# Patient Record
Sex: FEMALE | Race: WHITE
[De-identification: names, ages, dates, MRNs, and addresses within clinical notes are randomized per-mention and may not be internally consistent; named-entity substitution may affect disease eponyms.]

---

## 2018-04-12 ENCOUNTER — HOSPITAL ENCOUNTER (OUTPATIENT)
Dept: HOSPITAL 9 - MADLAB | Age: 63
Discharge: HOME | End: 2018-04-12
Payer: MEDICARE

## 2018-04-12 DIAGNOSIS — C90.00: Primary | ICD-10-CM

## 2018-04-12 LAB
BASOPHILS # BLD AUTO: 0.1 THOU/UL (ref 0–0.2)
BASOPHILS NFR BLD AUTO: 1.6 % (ref 0–1)
EOSINOPHIL # BLD AUTO: 0.5 THOU/UL (ref 0–0.7)
EOSINOPHIL NFR BLD AUTO: 6.7 % (ref 0–10)
HGB BLD-MCNC: 13.6 G/DL (ref 12–16)
LYMPHOCYTES # BLD AUTO: 2 THOU/UL (ref 1.2–3.4)
LYMPHOCYTES NFR BLD AUTO: 26 % (ref 21–51)
MCH RBC QN AUTO: 30.1 PG (ref 27–31)
MCV RBC AUTO: 90.4 FL (ref 81–99)
MONOCYTES # BLD AUTO: 0.5 THOU/UL (ref 0.11–0.59)
MONOCYTES NFR BLD AUTO: 6.9 % (ref 0–10)
NEUTROPHILS # BLD AUTO: 4.5 THOU/UL (ref 1.4–6.5)
NEUTROPHILS NFR BLD AUTO: 58.9 % (ref 42–75)
PLATELET # BLD AUTO: 239 THOU/UL (ref 130–400)
RBC # BLD AUTO: 4.51 MILL/UL (ref 4.2–5.4)
WBC # BLD AUTO: 7.6 THOU/UL (ref 4.8–10.8)

## 2018-04-12 PROCEDURE — 36415 COLL VENOUS BLD VENIPUNCTURE: CPT

## 2018-04-12 PROCEDURE — 85025 COMPLETE CBC W/AUTO DIFF WBC: CPT

## 2018-07-12 ENCOUNTER — HOSPITAL ENCOUNTER (OUTPATIENT)
Dept: HOSPITAL 9 - MADLAB | Age: 63
Discharge: HOME | End: 2018-07-12
Payer: MEDICARE

## 2018-07-12 DIAGNOSIS — I10: Primary | ICD-10-CM

## 2018-07-12 LAB
25(OH)D3+25(OH)D2 SERPL-MCNC: 43.6 NG/ML (ref 30–?)
ALBUMIN SERPL BCG-MCNC: 3.9 G/DL (ref 3.4–4.8)
ALP SERPL-CCNC: 73 U/L (ref 40–150)
ALT SERPL W P-5'-P-CCNC: 27 U/L (ref 8–55)
ANION GAP SERPL CALC-SCNC: 13 MMOL/L (ref 10–20)
AST SERPL-CCNC: 24 U/L (ref 5–34)
BASOPHILS # BLD AUTO: 0.1 THOU/UL (ref 0–0.2)
BASOPHILS NFR BLD AUTO: 2.7 % (ref 0–1)
BILIRUB SERPL-MCNC: 0.5 MG/DL (ref 0.2–1.2)
BUN SERPL-MCNC: 14 MG/DL (ref 9.8–20.1)
CALCIUM SERPL-MCNC: 9.5 MG/DL (ref 7.8–10.44)
CHD RISK SERPL-RTO: 2.7 (ref ?–4.5)
CHLORIDE SERPL-SCNC: 105 MMOL/L (ref 98–107)
CHOLEST SERPL-MCNC: 129 MG/DL
CO2 SERPL-SCNC: 23 MMOL/L (ref 23–31)
CREAT CL PREDICTED SERPL C-G-VRATE: 0 ML/MIN (ref 70–130)
EOSINOPHIL # BLD AUTO: 0.4 THOU/UL (ref 0–0.7)
EOSINOPHIL NFR BLD AUTO: 7.2 % (ref 0–10)
FREE THYROXINE INDEX: 2 (ref 1.4–3.1)
GLOBULIN SER CALC-MCNC: 2.7 G/DL (ref 2.4–3.5)
GLUCOSE SERPL-MCNC: 116 MG/DL (ref 80–115)
HDLC SERPL-MCNC: 47 MG/DL
HGB BLD-MCNC: 13.7 G/DL (ref 12–16)
IRON SERPL-MCNC: 96 UG/DL (ref 50–170)
LDLC SERPL CALC-MCNC: 64 MG/DL
LYMPHOCYTES # BLD AUTO: 2 THOU/UL (ref 1.2–3.4)
LYMPHOCYTES NFR BLD AUTO: 38.9 % (ref 21–51)
MCH RBC QN AUTO: 28 PG (ref 27–31)
MCV RBC AUTO: 87.7 FL (ref 78–98)
MONOCYTES # BLD AUTO: 0.5 THOU/UL (ref 0.11–0.59)
MONOCYTES NFR BLD AUTO: 10.1 % (ref 0–10)
NEUTROPHILS # BLD AUTO: 2.1 THOU/UL (ref 1.4–6.5)
NEUTROPHILS NFR BLD AUTO: 41.1 % (ref 42–75)
PLATELET # BLD AUTO: 177 THOU/UL (ref 130–400)
POTASSIUM SERPL-SCNC: 4.2 MMOL/L (ref 3.5–5.1)
RBC # BLD AUTO: 4.88 MILL/UL (ref 4.2–5.4)
SODIUM SERPL-SCNC: 137 MMOL/L (ref 136–145)
TRIGL SERPL-MCNC: 90 MG/DL (ref ?–150)
UIBC SERPL-MCNC: 333 MCG/DL (ref 265–497)
VIT B12 SERPL-MCNC: 535 PG/ML (ref 211–911)
WBC # BLD AUTO: 5.1 THOU/UL (ref 4.8–10.8)

## 2018-07-12 PROCEDURE — 84443 ASSAY THYROID STIM HORMONE: CPT

## 2018-07-12 PROCEDURE — 84481 FREE ASSAY (FT-3): CPT

## 2018-07-12 PROCEDURE — 83970 ASSAY OF PARATHORMONE: CPT

## 2018-07-12 PROCEDURE — 84425 ASSAY OF VITAMIN B-1: CPT

## 2018-07-12 PROCEDURE — 83550 IRON BINDING TEST: CPT

## 2018-07-12 PROCEDURE — 82607 VITAMIN B-12: CPT

## 2018-07-12 PROCEDURE — 36415 COLL VENOUS BLD VENIPUNCTURE: CPT

## 2018-07-12 PROCEDURE — 83540 ASSAY OF IRON: CPT

## 2018-07-12 PROCEDURE — 85025 COMPLETE CBC W/AUTO DIFF WBC: CPT

## 2018-07-12 PROCEDURE — 84207 ASSAY OF VITAMIN B-6: CPT

## 2018-07-12 PROCEDURE — 83036 HEMOGLOBIN GLYCOSYLATED A1C: CPT

## 2018-07-12 PROCEDURE — 84479 ASSAY OF THYROID (T3 OR T4): CPT

## 2018-07-12 PROCEDURE — 80061 LIPID PANEL: CPT

## 2018-07-12 PROCEDURE — 82306 VITAMIN D 25 HYDROXY: CPT

## 2018-07-12 PROCEDURE — 80053 COMPREHEN METABOLIC PANEL: CPT

## 2018-07-12 PROCEDURE — 82746 ASSAY OF FOLIC ACID SERUM: CPT

## 2018-08-24 ENCOUNTER — HOSPITAL ENCOUNTER (OUTPATIENT)
Dept: HOSPITAL 9 - MADLAB | Age: 63
Discharge: HOME | End: 2018-08-24
Attending: PHYSICIAN ASSISTANT
Payer: MEDICARE

## 2018-08-24 DIAGNOSIS — C90.00: Primary | ICD-10-CM

## 2018-08-24 LAB
BASOPHILS # BLD AUTO: 0.2 THOU/UL (ref 0–0.2)
BASOPHILS NFR BLD AUTO: 2.6 % (ref 0–1)
EOSINOPHIL # BLD AUTO: 0.3 THOU/UL (ref 0–0.7)
EOSINOPHIL NFR BLD AUTO: 5.4 % (ref 0–10)
HGB BLD-MCNC: 13.6 G/DL (ref 12–16)
LYMPHOCYTES # BLD AUTO: 2.3 THOU/UL (ref 1.2–3.4)
LYMPHOCYTES NFR BLD AUTO: 37.4 % (ref 21–51)
MCH RBC QN AUTO: 28.1 PG (ref 27–31)
MCV RBC AUTO: 86.8 FL (ref 78–98)
MONOCYTES # BLD AUTO: 0.6 THOU/UL (ref 0.11–0.59)
MONOCYTES NFR BLD AUTO: 10.3 % (ref 0–10)
NEUTROPHILS # BLD AUTO: 2.7 THOU/UL (ref 1.4–6.5)
NEUTROPHILS NFR BLD AUTO: 44.2 % (ref 42–75)
PLATELET # BLD AUTO: 187 THOU/UL (ref 130–400)
RBC # BLD AUTO: 4.85 MILL/UL (ref 4.2–5.4)
WBC # BLD AUTO: 6.1 THOU/UL (ref 4.8–10.8)

## 2018-08-24 PROCEDURE — 36415 COLL VENOUS BLD VENIPUNCTURE: CPT

## 2018-08-24 PROCEDURE — 85025 COMPLETE CBC W/AUTO DIFF WBC: CPT

## 2018-10-12 ENCOUNTER — HOSPITAL ENCOUNTER (OUTPATIENT)
Dept: HOSPITAL 9 - MADLAB | Age: 63
Discharge: HOME | End: 2018-10-12
Payer: MEDICARE

## 2018-10-12 DIAGNOSIS — E11.9: Primary | ICD-10-CM

## 2018-10-12 PROCEDURE — 82728 ASSAY OF FERRITIN: CPT

## 2018-10-12 PROCEDURE — 84443 ASSAY THYROID STIM HORMONE: CPT

## 2018-10-12 PROCEDURE — 80053 COMPREHEN METABOLIC PANEL: CPT

## 2018-10-12 PROCEDURE — 80061 LIPID PANEL: CPT

## 2018-10-12 PROCEDURE — 83970 ASSAY OF PARATHORMONE: CPT

## 2018-10-12 PROCEDURE — 82306 VITAMIN D 25 HYDROXY: CPT

## 2018-10-12 PROCEDURE — 84439 ASSAY OF FREE THYROXINE: CPT

## 2018-10-12 PROCEDURE — 83550 IRON BINDING TEST: CPT

## 2018-10-12 PROCEDURE — 84425 ASSAY OF VITAMIN B-1: CPT

## 2018-10-12 PROCEDURE — 85025 COMPLETE CBC W/AUTO DIFF WBC: CPT

## 2018-10-12 PROCEDURE — 84207 ASSAY OF VITAMIN B-6: CPT

## 2018-10-12 PROCEDURE — 82746 ASSAY OF FOLIC ACID SERUM: CPT

## 2018-10-12 PROCEDURE — 82607 VITAMIN B-12: CPT

## 2018-10-12 PROCEDURE — 36415 COLL VENOUS BLD VENIPUNCTURE: CPT

## 2018-10-12 PROCEDURE — 83540 ASSAY OF IRON: CPT

## 2018-10-12 PROCEDURE — 83036 HEMOGLOBIN GLYCOSYLATED A1C: CPT

## 2018-10-13 LAB
ALBUMIN SERPL BCG-MCNC: 4.1 G/DL (ref 3.4–4.8)
ALP SERPL-CCNC: 90 U/L (ref 40–150)
ALT SERPL W P-5'-P-CCNC: 24 U/L (ref 8–55)
ANION GAP SERPL CALC-SCNC: 14 MMOL/L (ref 10–20)
AST SERPL-CCNC: 21 U/L (ref 5–34)
BASOPHILS # BLD AUTO: 0.1 THOU/UL (ref 0–0.2)
BASOPHILS NFR BLD AUTO: 2.1 % (ref 0–1)
BILIRUB SERPL-MCNC: 0.5 MG/DL (ref 0.2–1.2)
BUN SERPL-MCNC: 15 MG/DL (ref 9.8–20.1)
CALCIUM SERPL-MCNC: 9.8 MG/DL (ref 7.8–10.44)
CHD RISK SERPL-RTO: 2.4 (ref ?–4.5)
CHLORIDE SERPL-SCNC: 106 MMOL/L (ref 98–107)
CHOLEST SERPL-MCNC: 133 MG/DL
CO2 SERPL-SCNC: 25 MMOL/L (ref 23–31)
CREAT CL PREDICTED SERPL C-G-VRATE: 0 ML/MIN (ref 70–130)
EOSINOPHIL # BLD AUTO: 0.3 THOU/UL (ref 0–0.7)
EOSINOPHIL NFR BLD AUTO: 5.1 % (ref 0–10)
GLOBULIN SER CALC-MCNC: 2.9 G/DL (ref 2.4–3.5)
GLUCOSE SERPL-MCNC: 123 MG/DL (ref 80–115)
HDLC SERPL-MCNC: 55 MG/DL
HGB BLD-MCNC: 13.9 G/DL (ref 12–16)
LDLC SERPL CALC-MCNC: 58 MG/DL
LYMPHOCYTES # BLD AUTO: 1.5 THOU/UL (ref 1.2–3.4)
LYMPHOCYTES NFR BLD AUTO: 28.2 % (ref 21–51)
MANUAL DIFF??: NO
MCH RBC QN AUTO: 29.4 PG (ref 27–31)
MCV RBC AUTO: 91.1 FL (ref 78–98)
MONOCYTES # BLD AUTO: 0.5 THOU/UL (ref 0.11–0.59)
MONOCYTES NFR BLD AUTO: 8.9 % (ref 0–10)
NEUTROPHILS # BLD AUTO: 2.9 THOU/UL (ref 1.4–6.5)
NEUTROPHILS NFR BLD AUTO: 55.7 % (ref 42–75)
PLATELET # BLD AUTO: 216 THOU/UL (ref 130–400)
POTASSIUM SERPL-SCNC: 3.9 MMOL/L (ref 3.5–5.1)
RBC # BLD AUTO: 4.75 MILL/UL (ref 4.2–5.4)
SODIUM SERPL-SCNC: 141 MMOL/L (ref 136–145)
TRIGL SERPL-MCNC: 99 MG/DL (ref ?–150)
TSH SERPL DL<=0.005 MIU/L-ACNC: 1.36 UIU/ML (ref 0.35–4.94)
WBC # BLD AUTO: 5.3 THOU/UL (ref 4.8–10.8)

## 2018-10-14 LAB
25(OH)D3+25(OH)D2 SERPL-MCNC: 67 NG/ML (ref 30–?)
FERRITIN SERPL-MCNC: 132.27 NG/ML (ref 10–291)
IRON SERPL-MCNC: 78 UG/DL (ref 50–170)
T4 FREE SERPL-MCNC: 1.05 NG/DL (ref 0.7–1.48)
UIBC SERPL-MCNC: 353 MCG/DL (ref 265–497)
VIT B12 SERPL-MCNC: 733 PG/ML (ref 211–911)

## 2018-11-06 ENCOUNTER — HOSPITAL ENCOUNTER (OUTPATIENT)
Dept: HOSPITAL 9 - MADLAB | Age: 63
Discharge: HOME | End: 2018-11-06
Attending: PHYSICIAN ASSISTANT
Payer: MEDICARE

## 2018-11-06 DIAGNOSIS — C90.00: Primary | ICD-10-CM

## 2018-11-06 LAB
BASOPHILS # BLD AUTO: 0.1 THOU/UL (ref 0–0.2)
BASOPHILS NFR BLD AUTO: 1.9 % (ref 0–1)
EOSINOPHIL # BLD AUTO: 0.4 THOU/UL (ref 0–0.7)
EOSINOPHIL NFR BLD AUTO: 5.7 % (ref 0–10)
HGB BLD-MCNC: 13 G/DL (ref 12–16)
LYMPHOCYTES # BLD AUTO: 2.5 THOU/UL (ref 1.2–3.4)
LYMPHOCYTES NFR BLD AUTO: 34.1 % (ref 21–51)
MCH RBC QN AUTO: 29.6 PG (ref 27–31)
MCV RBC AUTO: 92.3 FL (ref 78–98)
MONOCYTES # BLD AUTO: 0.6 THOU/UL (ref 0.11–0.59)
MONOCYTES NFR BLD AUTO: 8.5 % (ref 0–10)
NEUTROPHILS # BLD AUTO: 3.6 THOU/UL (ref 1.4–6.5)
NEUTROPHILS NFR BLD AUTO: 49.8 % (ref 42–75)
PLATELET # BLD AUTO: 229 THOU/UL (ref 130–400)
RBC # BLD AUTO: 4.38 MILL/UL (ref 4.2–5.4)
WBC # BLD AUTO: 7.2 THOU/UL (ref 4.8–10.8)

## 2018-11-06 PROCEDURE — 85025 COMPLETE CBC W/AUTO DIFF WBC: CPT

## 2018-11-06 PROCEDURE — 36415 COLL VENOUS BLD VENIPUNCTURE: CPT

## 2019-03-12 ENCOUNTER — HOSPITAL ENCOUNTER (OUTPATIENT)
Dept: HOSPITAL 9 - MADLAB | Age: 64
Discharge: HOME | End: 2019-03-12
Attending: PHYSICIAN ASSISTANT
Payer: MEDICARE

## 2019-03-12 DIAGNOSIS — C90.00: Primary | ICD-10-CM

## 2019-03-12 LAB
ANISOCYTOSIS BLD QL SMEAR: (no result) (100X)
BASOPHILS # BLD AUTO: 0.1 THOU/UL (ref 0–0.2)
BASOPHILS NFR BLD AUTO: 2.2 % (ref 0–1)
EOSINOPHIL # BLD AUTO: 0.2 THOU/UL (ref 0–0.7)
EOSINOPHIL NFR BLD AUTO: 4.1 % (ref 0–10)
HGB BLD-MCNC: 12.6 G/DL (ref 12–16)
LYMPHOCYTES NFR BLD AUTO: 35.4 % (ref 21–51)
MCH RBC QN AUTO: 29.8 PG (ref 27–31)
MCV RBC AUTO: 91.3 FL (ref 78–98)
MDIFF COMPLETE?: YES
MONOCYTES # BLD AUTO: 0.6 THOU/UL (ref 0.11–0.59)
MONOCYTES NFR BLD AUTO: 10.3 % (ref 0–10)
NEUTROPHILS # BLD AUTO: 2.9 THOU/UL (ref 1.4–6.5)
NEUTROPHILS NFR BLD AUTO: 48 % (ref 42–75)
PLATELET # BLD AUTO: 208 THOU/UL (ref 130–400)
RBC # BLD AUTO: 4.22 MILL/UL (ref 4.2–5.4)
WBC # BLD AUTO: 6.1 THOU/UL (ref 4.8–10.8)

## 2019-03-12 PROCEDURE — 36415 COLL VENOUS BLD VENIPUNCTURE: CPT

## 2019-03-12 PROCEDURE — 85025 COMPLETE CBC W/AUTO DIFF WBC: CPT

## 2019-03-14 LAB — LYMPHOCYTES # BLD AUTO: 2.2 THOU/UL (ref 1.2–3.4)

## 2019-03-27 ENCOUNTER — HOSPITAL ENCOUNTER (OUTPATIENT)
Dept: HOSPITAL 92 - BICMRI | Age: 64
Discharge: HOME | End: 2019-03-27
Attending: NEUROLOGICAL SURGERY
Payer: MEDICARE

## 2019-03-27 DIAGNOSIS — C90.00: ICD-10-CM

## 2019-03-27 DIAGNOSIS — Z98.890: ICD-10-CM

## 2019-03-27 DIAGNOSIS — M48.02: ICD-10-CM

## 2019-03-27 DIAGNOSIS — R41.89: ICD-10-CM

## 2019-03-27 DIAGNOSIS — M47.26: Primary | ICD-10-CM

## 2019-03-27 DIAGNOSIS — M47.16: ICD-10-CM

## 2019-03-27 DIAGNOSIS — G95.9: ICD-10-CM

## 2019-03-27 LAB — ESTIMATED GFR-MDRD - POC: (no result)

## 2019-03-27 PROCEDURE — 82565 ASSAY OF CREATININE: CPT

## 2019-03-27 PROCEDURE — 72156 MRI NECK SPINE W/O & W/DYE: CPT

## 2019-03-27 PROCEDURE — 70553 MRI BRAIN STEM W/O & W/DYE: CPT

## 2019-03-27 PROCEDURE — 72131 CT LUMBAR SPINE W/O DYE: CPT

## 2019-03-27 NOTE — MRI
[facility] brain with and without contrast:

3/27/2019



HISTORY:

63-year-old female with R 41.89 cognitive change.



FINDINGS:

There is no restricted diffusion. Ventricles are normal in size and configuration. No abnormal intra-
axial enhancement, intra-axial mass, mass effect, midline shift, or extra-axial fluid collection. The
re are several small patchy focal T2 hyperintense lesions ranging from a few millimeters to slightly 
greater than 1 cm in size in the bilateral corona radiata and centrum semiovale. These are nonspecifi
c, but are statistically most likely represent chronic ischemic white matter changes due to microvasc
ular atherosclerosis. Mild such changes are present in the flores. No evidence of recent or remote intr
a-axial hemorrhage.



IMPRESSION:

1. Mild chronic ischemic white matter changes.

2. Otherwise negative.



Reported By: Trevor Marmolejo 

Electronically Signed:  3/27/2019 3:25 PM

## 2019-03-27 NOTE — MRI
[facility] CERVICAL SPINE WITH AND WITHOUT CONTRAST:



HISTORY: Cervical myelopathy. The patient has had a previous history of multiple myeloma.



TECHNIQUE:

Multiplanar, multisequence pre and post contrast enhanced MR images cervical spine obtained.



C1-C2: Unremarkable.



C2-C3: There is a mild broad-based disc bulge. No significant degree of central or neural foraminal n
arrowing seen.



C3-C4: There is broad-based disc osteophyte complex centrally, compressing the thecal sac. This resul
ts in moderate to severe central and mild cord compression. There is moderate to severe bilateral C3-
4 neural foraminal narrowing due to uncovertebral osteophyte hypertrophy.



C4-C5: There is a minimal broad-based central disc bulge. The neural foramen are patent.



The C5 vertebral body is atrophied, significantly smaller than adjacent signals.  It also has increas
ed signal in T1 and T2 weighted sequences, most compatible with extensive fatty changes.



C5-C6: Significant disc space height loss is seen. The central canal and neural foramen are patent.



C6-C7: There is a broad-based disc osteophyte complex centrally, compressing the thecal sac, resultin
g in moderate to severe thecal sac compression. Cord compression is seen. The right neural foramen is
 patent. Moderate to severe left C6-7 neural foraminal narrowing is seen, due to uncovertebral osteop
hyte hypertrophy.



C7-T1: Unremarkable.



IMPRESSION:

1.   C3-C4 and C6-C7 central spinal stenosis.



2.  There is some atrophy of the C5 vertebral body, with partial fusion with the upper C6 level.



Transcribed Date/Time: 3/27/2019 3:49 PM



Reported By: Fred Silvestre 

Electronically Signed:  3/27/2019 4:38 PM

## 2019-03-27 NOTE — CT
CT LUMBAR SPINE WITHOUT CONTRAST:

 

HISTORY: 

Previous fracture to the lumbar spine.  Pain radiating down the left leg.  Left leg weakness.

 

COMPARISON: 

None.

 

FINDINGS: 

There is leftward curvature of the lumbar spine.  Unilateral left-sided transpedicular screw at L5 an
d S1.  No perihardware lucency.  There is bone graft material in the posterior elements at L4, L5, an
d S1.  Laminectomy defect at L5 and to a lesser extent at L4.

 

No retroperitoneal mass, lymphadenopathy, or hematoma.  The visualized solid organs and alimentary ca
nal are unremarkable.

 

No prevertebral soft tissue swelling.

 

Limited evaluation of the contents of the central spinal canal and neural foramen due to technique.

 

T12-L1:  No significant central canal stenosis or significant foraminal narrowing.

 

L1-L2:  Severe loss of disk space height.  Nevertheless, no high-grade central canal stenosis.  Mild 
bilateral foraminal narrowing.

 

L2-L3:  Severe loss of disk space height. No high-grade central canal stenosis.  Moderate right and m
ild to moderate left foraminal narrowing.

 

L3-L4:  Severe loss of disk space height.  Generalized disk bulge, ligamentum flavum thickening, and 
facet hypertrophy result in mild to moderate central canal stenosis.  Right neural foramen is moderat
ely narrowed.  The left foramen is patent.

 

L4-L5:  Laminectomy defect.  Generalized disk bulge, posterior element hypertrophy result in at least
 mild central canal stenosis.  Mild bilateral neural foraminal narrowing.

 

L5-S1:  Laminectomy defect.  Limited evaluation of the central spinal canal.  No evidence of high-gra
de central canal stenosis.  Mild right and mild to moderate left foraminal narrowing.  The left neura
l foramen is slightly narrowed due to osteophyte formation along the inferior end plate of L5.  

 

IMPRESSION: 

1.  Degenerative change of the lumbar spine as described above.

 

2.  Fusion change of the lumbar spine as described above.

 

3.  No evidence of high-grade central spinal canal.  Varying degrees of foraminal narrowing as detail
ed above.

 

POS: [facility]

## 2019-04-12 ENCOUNTER — HOSPITAL ENCOUNTER (OUTPATIENT)
Dept: HOSPITAL 9 - MADLAB | Age: 64
Discharge: HOME | End: 2019-04-12
Payer: MEDICARE

## 2019-04-12 DIAGNOSIS — Z79.899: ICD-10-CM

## 2019-04-12 DIAGNOSIS — C90.00: Primary | ICD-10-CM

## 2019-04-12 LAB
ALBUMIN SERPL BCG-MCNC: 3.9 G/DL (ref 3.4–4.8)
ALP SERPL-CCNC: 77 U/L (ref 40–150)
ALT SERPL W P-5'-P-CCNC: 19 U/L (ref 8–55)
ANION GAP SERPL CALC-SCNC: 15 MMOL/L (ref 10–20)
AST SERPL-CCNC: 18 U/L (ref 5–34)
BASOPHILS # BLD AUTO: 0.1 THOU/UL (ref 0–0.2)
BASOPHILS NFR BLD AUTO: 2.2 % (ref 0–1)
BILIRUB DIRECT SERPL-MCNC: 0.2 MG/DL (ref 0.1–0.3)
BILIRUB SERPL-MCNC: 0.4 MG/DL (ref 0.2–1.2)
BUN SERPL-MCNC: 13 MG/DL (ref 9.8–20.1)
CALCIUM SERPL-MCNC: 9.4 MG/DL (ref 7.8–10.44)
CHD RISK SERPL-RTO: 2.7 (ref ?–4.5)
CHLORIDE SERPL-SCNC: 105 MMOL/L (ref 98–107)
CHOLEST SERPL-MCNC: 161 MG/DL
CO2 SERPL-SCNC: 25 MMOL/L (ref 23–31)
CREAT CL PREDICTED SERPL C-G-VRATE: 0 ML/MIN (ref 70–130)
EOSINOPHIL # BLD AUTO: 0.2 THOU/UL (ref 0–0.7)
EOSINOPHIL NFR BLD AUTO: 5.1 % (ref 0–10)
GLUCOSE SERPL-MCNC: 95 MG/DL (ref 80–115)
HDLC SERPL-MCNC: 59 MG/DL
HGB BLD-MCNC: 13 G/DL (ref 12–16)
IRON SERPL-MCNC: 70 UG/DL (ref 50–170)
LDLC SERPL CALC-MCNC: 86 MG/DL
LYMPHOCYTES # BLD AUTO: 1.3 THOU/UL (ref 1.2–3.4)
LYMPHOCYTES NFR BLD AUTO: 31.2 % (ref 21–51)
MCH RBC QN AUTO: 28.7 PG (ref 27–31)
MCV RBC AUTO: 89.7 FL (ref 78–98)
MONOCYTES # BLD AUTO: 0.4 THOU/UL (ref 0.11–0.59)
MONOCYTES NFR BLD AUTO: 9.8 % (ref 0–10)
NEUTROPHILS # BLD AUTO: 2.1 THOU/UL (ref 1.4–6.5)
NEUTROPHILS NFR BLD AUTO: 51.8 % (ref 42–75)
PLATELET # BLD AUTO: 188 THOU/UL (ref 130–400)
POTASSIUM SERPL-SCNC: 4 MMOL/L (ref 3.5–5.1)
RBC # BLD AUTO: 4.53 MILL/UL (ref 4.2–5.4)
SODIUM SERPL-SCNC: 141 MMOL/L (ref 136–145)
TRIGL SERPL-MCNC: 81 MG/DL (ref ?–150)
UIBC SERPL-MCNC: 299 MCG/DL (ref 265–497)
VIT B12 SERPL-MCNC: (no result) PG/ML (ref 211–911)
WBC # BLD AUTO: 4 THOU/UL (ref 4.8–10.8)

## 2019-04-12 PROCEDURE — 84590 ASSAY OF VITAMIN A: CPT

## 2019-04-12 PROCEDURE — 84630 ASSAY OF ZINC: CPT

## 2019-04-12 PROCEDURE — 80048 BASIC METABOLIC PNL TOTAL CA: CPT

## 2019-04-12 PROCEDURE — 82306 VITAMIN D 25 HYDROXY: CPT

## 2019-04-12 PROCEDURE — 83540 ASSAY OF IRON: CPT

## 2019-04-12 PROCEDURE — 82607 VITAMIN B-12: CPT

## 2019-04-12 PROCEDURE — 83036 HEMOGLOBIN GLYCOSYLATED A1C: CPT

## 2019-04-12 PROCEDURE — 85025 COMPLETE CBC W/AUTO DIFF WBC: CPT

## 2019-04-12 PROCEDURE — 83550 IRON BINDING TEST: CPT

## 2019-04-12 PROCEDURE — 80076 HEPATIC FUNCTION PANEL: CPT

## 2019-04-12 PROCEDURE — 82525 ASSAY OF COPPER: CPT

## 2019-04-12 PROCEDURE — 84425 ASSAY OF VITAMIN B-1: CPT

## 2019-04-12 PROCEDURE — 36415 COLL VENOUS BLD VENIPUNCTURE: CPT

## 2019-04-12 PROCEDURE — 80061 LIPID PANEL: CPT

## 2019-04-12 PROCEDURE — 82746 ASSAY OF FOLIC ACID SERUM: CPT

## 2019-05-14 ENCOUNTER — HOSPITAL ENCOUNTER (OUTPATIENT)
Dept: HOSPITAL 9 - MADLAB | Age: 64
Discharge: HOME | End: 2019-05-14
Attending: PHYSICIAN ASSISTANT
Payer: MEDICARE

## 2019-05-14 DIAGNOSIS — C90.00: Primary | ICD-10-CM

## 2019-05-14 LAB
BASOPHILS # BLD AUTO: 0.1 THOU/UL (ref 0–0.2)
BASOPHILS NFR BLD AUTO: 2 % (ref 0–1)
EOSINOPHIL # BLD AUTO: 0.3 THOU/UL (ref 0–0.7)
EOSINOPHIL NFR BLD AUTO: 6 % (ref 0–10)
HGB BLD-MCNC: 12.6 G/DL (ref 12–16)
LYMPHOCYTES # BLD AUTO: 2 THOU/UL (ref 1.2–3.4)
LYMPHOCYTES NFR BLD AUTO: 35.8 % (ref 21–51)
MCH RBC QN AUTO: 28.9 PG (ref 27–31)
MCV RBC AUTO: 91 FL (ref 78–98)
MONOCYTES # BLD AUTO: 0.6 THOU/UL (ref 0.11–0.59)
MONOCYTES NFR BLD AUTO: 9.9 % (ref 0–10)
NEUTROPHILS # BLD AUTO: 2.6 THOU/UL (ref 1.4–6.5)
NEUTROPHILS NFR BLD AUTO: 46.3 % (ref 42–75)
PLATELET # BLD AUTO: 190 THOU/UL (ref 130–400)
RBC # BLD AUTO: 4.38 MILL/UL (ref 4.2–5.4)
WBC # BLD AUTO: 5.6 THOU/UL (ref 4.8–10.8)

## 2019-05-14 PROCEDURE — 85025 COMPLETE CBC W/AUTO DIFF WBC: CPT

## 2019-05-14 PROCEDURE — 36415 COLL VENOUS BLD VENIPUNCTURE: CPT

## 2019-07-27 ENCOUNTER — HOSPITAL ENCOUNTER (OUTPATIENT)
Dept: HOSPITAL 92 - ERS | Age: 64
Setting detail: OBSERVATION
LOS: 2 days | Discharge: HOME HEALTH | End: 2019-07-29
Attending: FAMILY MEDICINE | Admitting: FAMILY MEDICINE
Payer: MEDICARE

## 2019-07-27 VITALS — BODY MASS INDEX: 29.4 KG/M2

## 2019-07-27 DIAGNOSIS — M47.812: ICD-10-CM
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
## 2019-07-27 DIAGNOSIS — I10: ICD-10-CM

## 2019-07-27 DIAGNOSIS — S82.401A: ICD-10-CM

## 2019-07-27 DIAGNOSIS — C90.00: ICD-10-CM

## 2019-07-27 DIAGNOSIS — R07.89: ICD-10-CM

## 2019-07-27 DIAGNOSIS — Z79.899: ICD-10-CM

## 2019-07-27 DIAGNOSIS — R55: Primary | ICD-10-CM
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
## 2019-07-27 DIAGNOSIS — S93.402A: ICD-10-CM

## 2019-07-27 DIAGNOSIS — E03.9: ICD-10-CM

## 2019-07-27 DIAGNOSIS — F32.9: ICD-10-CM

## 2019-07-27 DIAGNOSIS — F41.8: ICD-10-CM

## 2019-07-27 LAB
ALBUMIN SERPL BCG-MCNC: 4.4 G/DL (ref 3.4–4.8)
ALP SERPL-CCNC: 94 U/L (ref 40–150)
ALT SERPL W P-5'-P-CCNC: 20 U/L (ref 8–55)
ANION GAP SERPL CALC-SCNC: 14 MMOL/L (ref 10–20)
AST SERPL-CCNC: 24 U/L (ref 5–34)
BASOPHILS # BLD AUTO: 0.1 THOU/UL (ref 0–0.2)
BASOPHILS NFR BLD AUTO: 1.4 % (ref 0–1)
BILIRUB SERPL-MCNC: 0.6 MG/DL (ref 0.2–1.2)
BUN SERPL-MCNC: 17 MG/DL (ref 9.8–20.1)
CALCIUM SERPL-MCNC: 9.9 MG/DL (ref 7.8–10.44)
CHLORIDE SERPL-SCNC: 99 MMOL/L (ref 98–107)
CK MB SERPL-MCNC: 1 NG/ML (ref 0–6.6)
CK SERPL-CCNC: 169 U/L (ref 29–168)
CO2 SERPL-SCNC: 28 MMOL/L (ref 23–31)
CREAT CL PREDICTED SERPL C-G-VRATE: 0 ML/MIN (ref 70–130)
EOSINOPHIL # BLD AUTO: 0.2 THOU/UL (ref 0–0.7)
EOSINOPHIL NFR BLD AUTO: 2.9 % (ref 0–10)
GLOBULIN SER CALC-MCNC: 3 G/DL (ref 2.4–3.5)
GLUCOSE SERPL-MCNC: 90 MG/DL (ref 80–115)
HGB BLD-MCNC: 14.4 G/DL (ref 12–16)
LIPASE SERPL-CCNC: 7 U/L (ref 8–78)
LYMPHOCYTES # BLD: 1.9 THOU/UL (ref 1.2–3.4)
LYMPHOCYTES NFR BLD AUTO: 32.1 % (ref 21–51)
MCH RBC QN AUTO: 30.9 PG (ref 27–31)
MCV RBC AUTO: 92.4 FL (ref 78–98)
MONOCYTES # BLD AUTO: 0.5 THOU/UL (ref 0.11–0.59)
MONOCYTES NFR BLD AUTO: 8 % (ref 0–10)
NEUTROPHILS # BLD AUTO: 3.2 THOU/UL (ref 1.4–6.5)
NEUTROPHILS NFR BLD AUTO: 55.5 % (ref 42–75)
PLATELET # BLD AUTO: 197 THOU/UL (ref 130–400)
POTASSIUM SERPL-SCNC: 4 MMOL/L (ref 3.5–5.1)
RBC # BLD AUTO: 4.65 MILL/UL (ref 4.2–5.4)
SODIUM SERPL-SCNC: 137 MMOL/L (ref 136–145)
TROPONIN I SERPL DL<=0.01 NG/ML-MCNC: (no result) NG/ML (ref ?–0.03)
TROPONIN I SERPL DL<=0.01 NG/ML-MCNC: (no result) NG/ML (ref ?–0.03)
WBC # BLD AUTO: 5.8 THOU/UL (ref 4.8–10.8)

## 2019-07-27 PROCEDURE — 93010 ELECTROCARDIOGRAM REPORT: CPT

## 2019-07-27 PROCEDURE — 96376 TX/PRO/DX INJ SAME DRUG ADON: CPT

## 2019-07-27 PROCEDURE — 93306 TTE W/DOPPLER COMPLETE: CPT

## 2019-07-27 PROCEDURE — 99285 EMERGENCY DEPT VISIT HI MDM: CPT

## 2019-07-27 PROCEDURE — 96374 THER/PROPH/DIAG INJ IV PUSH: CPT

## 2019-07-27 PROCEDURE — 72125 CT NECK SPINE W/O DYE: CPT

## 2019-07-27 PROCEDURE — 29515 APPLICATION SHORT LEG SPLINT: CPT

## 2019-07-27 PROCEDURE — 93880 EXTRACRANIAL BILAT STUDY: CPT

## 2019-07-27 PROCEDURE — 97116 GAIT TRAINING THERAPY: CPT

## 2019-07-27 PROCEDURE — 20610 DRAIN/INJ JOINT/BURSA W/O US: CPT

## 2019-07-27 PROCEDURE — 93005 ELECTROCARDIOGRAM TRACING: CPT

## 2019-07-27 PROCEDURE — 80061 LIPID PANEL: CPT

## 2019-07-27 PROCEDURE — 93017 CV STRESS TEST TRACING ONLY: CPT

## 2019-07-27 PROCEDURE — 80053 COMPREHEN METABOLIC PANEL: CPT

## 2019-07-27 PROCEDURE — 84100 ASSAY OF PHOSPHORUS: CPT

## 2019-07-27 PROCEDURE — G0378 HOSPITAL OBSERVATION PER HR: HCPCS

## 2019-07-27 PROCEDURE — A9500 TC99M SESTAMIBI: HCPCS

## 2019-07-27 PROCEDURE — 78452 HT MUSCLE IMAGE SPECT MULT: CPT

## 2019-07-27 PROCEDURE — 96372 THER/PROPH/DIAG INJ SC/IM: CPT

## 2019-07-27 PROCEDURE — 83036 HEMOGLOBIN GLYCOSYLATED A1C: CPT

## 2019-07-27 PROCEDURE — 73630 X-RAY EXAM OF FOOT: CPT

## 2019-07-27 PROCEDURE — 84484 ASSAY OF TROPONIN QUANT: CPT

## 2019-07-27 PROCEDURE — 73610 X-RAY EXAM OF ANKLE: CPT

## 2019-07-27 PROCEDURE — 83690 ASSAY OF LIPASE: CPT

## 2019-07-27 PROCEDURE — 36415 COLL VENOUS BLD VENIPUNCTURE: CPT

## 2019-07-27 PROCEDURE — 82553 CREATINE MB FRACTION: CPT

## 2019-07-27 PROCEDURE — 85025 COMPLETE CBC W/AUTO DIFF WBC: CPT

## 2019-07-27 PROCEDURE — 71045 X-RAY EXAM CHEST 1 VIEW: CPT

## 2019-07-27 PROCEDURE — 97139 UNLISTED THERAPEUTIC PX: CPT

## 2019-07-27 PROCEDURE — 70450 CT HEAD/BRAIN W/O DYE: CPT

## 2019-07-27 PROCEDURE — 84443 ASSAY THYROID STIM HORMONE: CPT

## 2019-07-27 PROCEDURE — 82550 ASSAY OF CK (CPK): CPT

## 2019-07-27 PROCEDURE — 83735 ASSAY OF MAGNESIUM: CPT

## 2019-07-27 PROCEDURE — 82306 VITAMIN D 25 HYDROXY: CPT

## 2019-07-27 RX ADMIN — NITROGLYCERIN SCH INCH: 20 OINTMENT TOPICAL at 22:06

## 2019-07-27 NOTE — RAD
RIGHT ANKLE 3 VIEWS:

 

Date:  07/27/19 

 

HISTORY:  

Fall, right ankle pain. 

 

FINDINGS/IMPRESSION: 

There is an oblique, minimally displaced fracture involving the distal fibula. Soft tissue swelling i
s present. The ankle mortise is maintained. 

 

 

[facility]: [facility]

## 2019-07-27 NOTE — CT
CT Brain WO Con:



7/27/2019 1:00 PM



CLINICAL HISTORY: Syncopal episode and fall.



IMAGING TECHNIQUE: Multiple CT images were obtained of the brain without IV contrast.



COMPARISON: MR the brain dated March 27, 2019



FINDINGS: 

Infarct:  No acute infarct is evident. There is stable mild chronic small vessel white matter ischemi
c change.

Hemorrhage: None..

Hydrocephalus: None..

Basal cisterns: Normal..

Cerebral parenchyma: Normal..

Midline shift: None..

Cerebellum: Normal.

Brainstem: Normal.



OTHER:



Calvarium: Intact..

Visualized Paranasal sinuses: Clear..

Extracranial soft tissues:Normal.



IMPRESSION:



No acute intracranial abnormality. 



Reported By: Osmar Cortes 

Electronically Signed:  7/27/2019 1:22 PM

## 2019-07-27 NOTE — PDOC.FPRHP
- History of Present Illness


Chief Complaint: Fracture, syncope


History of Present Illness: 





This 63-year-old female w/ significant PMHx including multiple myeloma, gastric 

bypass, SVT, HLD, and hypothyroidism presented to the ER today after having a 

fall yesterday. The fall occurred when she fainted near the stairs and 

sustained a R distal fibula fracture and L ankle sprain. She reports her 

daughter, who is intellectually disabled, could not wake her up for a couple of 

minutes. Before the fall, the patient became weak and tried to brace herself. 

Patient reports she has fallen about twice a month for the past several months 

and attributes this to dehydration as well as low blood pressure, which is why 

she stopped taking her bisoprolol for her SVT. She denies any other symptoms 

associated with her syncopal episodes.





When patient arrived to the ED, she began experiencing chest pain she describes 

as pressure which radiates to her right ribs and upwards to her neck some. She 

denies nausea, vomiting, or diaphoresis with these episodes. She has had this 

chest pain previously and notes the onset of chest pain a few months ago. She 

likes to garden and the pain worsens with exertion. It improves after she takes 

an aspirin and lays down ~30 min. The chest pain is associated w/ SOB. 





ED Course: 





Patient arrived to ED where her R distal fibula fracture was splinted. After 

onset of chest pain, nitro paste placed on chest. Pain was better. 








- Allergies/Adverse Reactions


 Allergies











Allergy/AdvReac Type Severity Reaction Status Date / Time


 


tetracycline Allergy   Verified 07/28/19 05:54














- Home Medications


 











 Medication  Instructions  Recorded  Confirmed  Type


 


Albuterol Sulfate [Proair HFA] 8.5 g INH Q6H PRN 02/04/17 07/27/19 History


 


Ipratropium/Albuterol Sulfate 3 ml NEB QID PRN 02/04/17 07/27/19 History





[DuoNeb]    


 


Levothyroxine Sodium 50 mcg PO DAILY 02/04/17 07/27/19 History


 


fentaNYL 25 mcg TOP Q3DAYS 02/04/17 07/27/19 History


 


valACYclovir HCl [valACYclovir] 500 mg PO DAILY 02/04/17 07/27/19 History


 


Dicyclomine [Bentyl] 20 mg PO QID #120 tab 02/13/17 07/27/19 Rx


 


Ondansetron [Zofran ODT] 4 mg PO Q6H PRN #20 tab 02/13/17 07/27/19 Rx


 


Saccharomyces boulardii [Florastor] 250 mg PO DAILY #30 cap 02/13/17 07/27/19 Rx


 


Thalidomide [Thalomid] 50 mg PO DAILY 07/27/19 07/27/19 History


 


traZODone HCl [Trazodone HCl] 100 mg PO PRN PRN 07/27/19 07/27/19 History














- History


PMHx: 


- Multiple myeloma diagnosed in 2011, on maintenance chemo


- Stem cell transplant 2014


- Multilevel back fx 


- Scoliosis 


- Degenerative disk disease of spine, 


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 


- Hx of SVT


- anxiety, depression, PTSD





PSHx: 


- multilevel spinal surgery requiring hardware


- gastric bypass April 2018: 100 pounds weight loss since then


- hysterectomy 2003


- tonsillectomy as child


- knee arthroscopy


- cholecystectomy 2014





FHx:


Dad: lung cancer, smoker


Mom: heart disease, diabetes


Daughter: bipolar disorder, intellectual disability


 


Social:


- lives with daughter


- Denies tobacco, alcohol, drug use





PCP: JEAN-PIERRE Major.








- Review of Systems


General: denies: fever/chills, weight/appetite/sleep changes


Eyes: denies: eye pain, vision changes


ENT: denies: nasal congestion, rhinorrhea


Respiratory: reports: shortness of breath, exercise intolerance.  denies: cough

, congestion


Cardiovascular: reports: chest pain, edema


Gastrointestinal: denies: nausea, diarrhea, constipation


Genitourinary: denies: dysuria


Skin: reports: rashes.  denies: jaundice


Musculoskeletal: denies: pain, stiffness


Neurological: reports: syncope.  denies: numbness, seizure


Psychological: reports: anxiety, depression





- Vital signs


BP: 124/75


HR: 56


RR: 16


98% on RA


Wt: 68 kg











- Physical Exam


Constitutional: NAD, awake, alert and oriented


HEENT: PERRLA, EOMI, MMM


Neck: supple, no LAD, no JVD, no thyromegaly


Chest: no-tender to palpation


Heart: RRR (slightly bradycardic), normal S1/S2, no murmurs/rubs/gallops, 

pulses present


Lungs: CTAB, no respiratory distress, good air movement


Abdomen: soft, non-tender, bowel sounds present


Musculoskeletal: normal structure, normal tone


-Musculoskeletal: 


Patient had right leg splinted.





Neurological: no focal deficit, CN II-XII intact


Skin: no rash/lesions, good turgor


Heme/Lymphatic: no unusual bruising or bleeding, no LAD


Psychiatric: normal mood and affect, good judgment and insight





FMR H&P: Results





- Labs


Result Diagrams: 


 07/28/19 05:52





 07/28/19 05:52


Lab results: 


 











WBC  5.8 thou/uL (4.8-10.8)   07/27/19  14:53    


 


Hgb  14.4 g/dL (12.0-16.0)   07/27/19  14:53    


 


Hct  43.0 % (36.0-47.0)   07/27/19  14:53    


 


MCV  92.4 fL (78.0-98.0)   07/27/19  14:53    


 


Plt Count  197 thou/uL (130-400)   07/27/19  14:53    


 


Neutrophils %  55.5 % (42.0-75.0)   07/27/19  14:53    


 


Sodium  137 mmol/L (136-145)   07/27/19  14:53    


 


Potassium  4.0 mmol/L (3.5-5.1)   07/27/19  14:53    


 


Chloride  99 mmol/L ()   07/27/19  14:53    


 


Carbon Dioxide  28 mmol/L (23-31)   07/27/19  14:53    


 


BUN  17 mg/dL (9.8-20.1)   07/27/19  14:53    


 


Creatinine  0.65 mg/dL (0.6-1.1)   07/27/19  14:53    


 


Glucose  90 mg/dL ()   07/27/19  14:53    


 


Calcium  9.9 mg/dL (7.8-10.44)   07/27/19  14:53    


 


Total Bilirubin  0.6 mg/dL (0.2-1.2)   07/27/19  14:53    


 


AST  24 U/L (5-34)   07/27/19  14:53    


 


ALT  20 U/L (8-55)   07/27/19  14:53    


 


Alkaline Phosphatase  94 U/L ()   07/27/19  14:53    


 


Creatine Kinase  169 U/L ()  H  07/27/19  14:53    


 


Serum Total Protein  7.4 g/dL (6.0-8.3)   07/27/19  14:53    


 


Albumin  4.4 g/dL (3.4-4.8)   07/27/19  14:53    


 


Lipase  7 U/L (8-78)  L  07/27/19  14:53    














- EKG Interpretation


EKG: 


Sinus bradycardia w/ 1st degree heart block. Possible RBBB. No evidence of ST 

changes.








- Radiology Interpretation


  ** Chest x-ray


Status: image reviewed by me, report reviewed by me


Additional comment: 





No acute process





  ** CT scan - head


Status: report reviewed by me


Additional comment: 





No acute process





  ** Other


Status: report reviewed by me


Additional comment: 





X-ray of R foot: spiral fracture of lateral malleolus, distal oblique, 

minimally displaced fibular fracture


X-ray L: no fracture.





FMR H&P: A/P





- Problem List


(1) Right fibular fracture


Current Visit: Yes   Status: Acute   Code(s): S82.401A - UNSP FRACTURE OF SHAFT 

OF RIGHT FIBULA, INIT FOR CLOS FX   





(2) Chest pain


Current Visit: Yes   Status: Acute   Code(s): R07.9 - CHEST PAIN, UNSPECIFIED   





(3) Left ankle sprain


Current Visit: Yes   Status: Acute   Code(s): S93.402A - SPRAIN OF UNSPECIFIED 

LIGAMENT OF LEFT ANKLE, INIT ENCNTR   





(4) Syncope and collapse


Current Visit: Yes   Status: Acute   Code(s): R55 - SYNCOPE AND COLLAPSE   





(5) Anxiety


Current Visit: No   Status: Chronic   Code(s): F41.9 - ANXIETY DISORDER, 

UNSPECIFIED   





(6) Chronic pain


Current Visit: No   Status: Chronic   Code(s): G89.29 - OTHER CHRONIC PAIN   





(7) Depression


Current Visit: No   Status: Chronic   Code(s): F32.9 - MAJOR DEPRESSIVE DISORDER

, SINGLE EPISODE, UNSPECIFIED   





(8) Hyperlipidemia


Current Visit: No   Status: Chronic   Code(s): E78.5 - HYPERLIPIDEMIA, 

UNSPECIFIED   





(9) Hypothyroidism


Current Visit: No   Status: Chronic   Code(s): E03.9 - HYPOTHYROIDISM, 

UNSPECIFIED   


Qualifiers: 


 





(10) Multiple myeloma


Current Visit: No   Status: Chronic   Code(s): C90.00 - MULTIPLE MYELOMA NOT 

HAVING ACHIEVED REMISSION   


Qualifiers: 


 





(11) History of supraventricular tachycardia


Current Visit: Yes   Status: Chronic   Code(s): Z86.79 - PERSONAL HISTORY OF 

OTHER DISEASES OF THE CIRCULATORY SYSTEM   





- Plan





63-yo female w/ multiple complex medical issues presenting for:





1. Syncope & collapse


- Will pursue syncope workup: 


- Bilateral Carotid U/S pending


- ECHO pending


- Orthostatic vital signs pending


- IV Fluids LR





2. R Fibular fracture


- No ambulation 


- Ortho consult in AM





3. L ankle sprain


- Pain management


- Continue patient's fentanyl patch





4. Chest pain, r/o ACS


- In conjunction w/ syncope workup, we will rule out ACS: 


- Nuclear stress test in AM, NPO at midnight


- Repeat EKG as needed


- ECHO as above


- Nitro prn


- Trend troponins


- TSH, Hgb A1C, Magnesium, Phosphorus pending


- PPx: LVX. 


- Telemetry observation


- Patient's multiple myeloma not only places her at risk for clotting, but also 

at risk for cardiomyopathy. 





Chronic problems:


Multiple myeloma: continue thalomid


HTN: monitor vitals. Patient seems to run on the more hypotensive side.


Hypothyroidism: continue synthroid


Anxiety/Depression: continue meds





Code status: FULL


PPx: LVX








Becky Munoz MD


PGY-1























FMR H&P: Upper Level





- Plan


Date/Time: 07/27/19 1621





64 yo f presents with syncope and chest pain. She fell upon fainting and hurt 

her right ankle/leg. Chest pain described as left sided that raps around to her 

back, pressure-like, no n/v/d, but does endorse shortness of breath, denies 

diaphoresis, does not radiate, worse with exertion, relieved by rest.








Vitals:


sinus bradycarida 50-60s, otherwise wnl





PE:


NAD


Sinus bradycardia, possible 1/6 systolic murmur


CTAB


ankles wrapped b/l, right ankle/shin splinted 


a&Ox3





Labs:


trop <.o1





Sinus bradycardia w/ 1st degree heart block. Possible RBBB. No evidence of ST 

changes.





X-ray of R foot: spiral fracture of lateral malleolus, distal oblique, 

minimally displaced fibular fracture





A/P:


Atypical chest pain-troponins negative, no ST changes, although unsureif the 1 

degree block and possible RBBB are new. Will trend troponins, and check serial 

EKG's if chest pain persists. Currently pain resolved in the room. No prior hx 

of MI. Will admit overnight and get a stress in the am. Pt received 324mg 

aspirin in the ER and 81mg daily started. Will check mg, phosph, tsh, hba1c as 

well. Will get an echo for possible murmur.





Syncope


-cardiogenic vs dehydration; will get orthostatics, echo, serial EKGs, and 

carotid doppler US b/l to further assess. Pt asymptomatic in the room currently 

and bp's wnl.





Fibular fracture-pt currently splinted; will consult ortho in am.





QUIN SUGGS MD, PGy-3 have evaluated this patient and agree with findings/

plan as outlined by intern resident. Pertinent changes/additions are listed 

here.








Addendum - Attending





- Attending Attestation


Date/Time: 07/27/19 0125





I personally evaluated the patient and discussed the management with Dr. Zepeda.


I agree with the History, Examination, Assessment and Plan documented above 

with any addition or exceptions noted below.


The patient presented following a fall/syncopal spell where she fractured her 

right fibula.  The patient then developed a squeezing chest pain radiating to 

the left ribs while in the ER.  She will get a carotid doppler and echo.  The 

patient will also have enzymes trended, will get chemical stress test tomorrow.

  Nitro for pain.  Pt's leg has been splinted.

## 2019-07-27 NOTE — RAD
LEFT ANKLE 3 VIEWS:

 

Date:  07/27/19 

 

HISTORY:  

Fall. Left ankle pain. 

 

FINDINGS/IMPRESSION: 

The ankle mortise is maintained. No fracture or dislocation is seen. Posterior and plantar calcaneal 
spurs are present. Soft tissue swelling is present. 

 

 

POS: [facility]

## 2019-07-27 NOTE — RAD
XR Foot Rt 3 View STANDARD



INDICATION: Syncopal episode with foot injury



COMPARISON: None.



FINDINGS:



Bones: There is healed fracture deformity of the fifth digit metatarsal. There is a spiral fracture o
f the lateral malleolus.



Joints: There is moderate metatarsus primus varus and hallux valgus deformity. There is a moderate-si
zed bunion overlying the great toe metatarsal head. Small calcification is seen lateral to the

great toe proximal phalangeal base.



Lisfranc alignment: Lisfranc alignment appears within normal limits.



Soft tissues: There is a moderate-sized bunion overlying the great toe metatarsal head.



IMPRESSION: No acute fracture or subluxation demonstrated of the right foot. Spiral fracture of the l
ateral malleolus.



Reported By: Osmar Cortes 

Electronically Signed:  7/27/2019 2:15 PM

## 2019-07-27 NOTE — RAD
Chest one view



HISTORY: Syncope. Chest pain.



COMPARISON: 2/3/2017.



FINDINGS: Cardiac silhouette is magnified by projection. Pulmonary vasculature is unremarkable. Media
stinum is midline. No lobar consolidation or evidence of pneumothorax.







IMPRESSION: No active cardiopulmonary abnormalities are demonstrated.



Reported By: RICHARD Collins 

Electronically Signed:  7/27/2019 3:35 PM

## 2019-07-27 NOTE — CT
CT Cervical Spine WO Con



Indication: Syncopal episode with fall and neck pain



COMPARISON: MR the cervical spine dated March 27, 2019



FINDINGS:



Acute fracture/subluxation: None.



Spinal alignment: There is slight anterior translation of C3 on C4 which is degenerative.



Craniocervical junction: Within normal limits.



Vertebral body heights: There is ankylosis of C4-C6 including both the intervertebral bodies as well 
as the facet complexes. There is degenerative subchondral cystlike abnormality is seen within C5,

C4 and C6.



Cervical spine degenerative change: There is moderate to severe multilevel spondylosis of the cervica
l spine. There is prominent disc osteophyte complex at C6-C7 causing some mild osseous central

canal narrowing



Lung apices: Clear.



IMPRESSION:



1. No acute fracture or subluxation demonstrated.

2. Moderate to severe spondylosis of the cervical spine



Reported By: Osmar Cortes 

Electronically Signed:  7/27/2019 1:27 PM

## 2019-07-28 LAB
ALBUMIN SERPL BCG-MCNC: 3.2 G/DL (ref 3.4–4.8)
ALP SERPL-CCNC: 62 U/L (ref 40–150)
ALT SERPL W P-5'-P-CCNC: 15 U/L (ref 8–55)
ANION GAP SERPL CALC-SCNC: 8 MMOL/L (ref 10–20)
AST SERPL-CCNC: 15 U/L (ref 5–34)
BASOPHILS # BLD AUTO: 0 THOU/UL (ref 0–0.2)
BASOPHILS NFR BLD AUTO: 1.4 % (ref 0–1)
BILIRUB SERPL-MCNC: 0.3 MG/DL (ref 0.2–1.2)
BUN SERPL-MCNC: 15 MG/DL (ref 9.8–20.1)
CALCIUM SERPL-MCNC: 9 MG/DL (ref 7.8–10.44)
CHD RISK SERPL-RTO: 2.6 (ref ?–4.5)
CHLORIDE SERPL-SCNC: 105 MMOL/L (ref 98–107)
CHOLEST SERPL-MCNC: 126 MG/DL
CK MB SERPL-MCNC: 0.7 NG/ML (ref 0–6.6)
CO2 SERPL-SCNC: 30 MMOL/L (ref 23–31)
CREAT CL PREDICTED SERPL C-G-VRATE: 106 ML/MIN (ref 70–130)
EOSINOPHIL # BLD AUTO: 0.2 THOU/UL (ref 0–0.7)
EOSINOPHIL NFR BLD AUTO: 5.8 % (ref 0–10)
GLOBULIN SER CALC-MCNC: 2.2 G/DL (ref 2.4–3.5)
GLUCOSE SERPL-MCNC: 91 MG/DL (ref 80–115)
HDLC SERPL-MCNC: 49 MG/DL
HGB BLD-MCNC: 11.7 G/DL (ref 12–16)
LDLC SERPL CALC-MCNC: 65 MG/DL
LYMPHOCYTES # BLD: 1.4 THOU/UL (ref 1.2–3.4)
LYMPHOCYTES NFR BLD AUTO: 42.7 % (ref 21–51)
MCH RBC QN AUTO: 31.3 PG (ref 27–31)
MCV RBC AUTO: 92.7 FL (ref 78–98)
MONOCYTES # BLD AUTO: 0.4 THOU/UL (ref 0.11–0.59)
MONOCYTES NFR BLD AUTO: 11.4 % (ref 0–10)
NEUTROPHILS # BLD AUTO: 1.3 THOU/UL (ref 1.4–6.5)
NEUTROPHILS NFR BLD AUTO: 38.7 % (ref 42–75)
PLATELET # BLD AUTO: 149 THOU/UL (ref 130–400)
POTASSIUM SERPL-SCNC: 3.8 MMOL/L (ref 3.5–5.1)
RBC # BLD AUTO: 3.75 MILL/UL (ref 4.2–5.4)
SODIUM SERPL-SCNC: 139 MMOL/L (ref 136–145)
TRIGL SERPL-MCNC: 61 MG/DL (ref ?–150)
TROPONIN I SERPL DL<=0.01 NG/ML-MCNC: (no result) NG/ML (ref ?–0.03)
WBC # BLD AUTO: 3.4 THOU/UL (ref 4.8–10.8)

## 2019-07-28 RX ADMIN — NITROGLYCERIN SCH: 20 OINTMENT TOPICAL at 17:00

## 2019-07-28 RX ADMIN — NITROGLYCERIN SCH: 20 OINTMENT TOPICAL at 05:46

## 2019-07-28 RX ADMIN — NITROGLYCERIN SCH INCH: 20 OINTMENT TOPICAL at 19:53

## 2019-07-28 NOTE — ULT
BILATERAL CAROTID DUPLEX ULTRASOUND:

 

DATE:  07/28/19 

 

HISTORY:  

Syncope. 

 

TECHNIQUE:  

Gray scale ultrasound with color flow and spectral Doppler imaging of the extracranial carotid artery
 systems performed bilaterally. 

 

FINDINGS:

 

There is tortuosity of the left internal carotid artery and both vertebral arteries. There is a small
 amount of plaque formation noted on either side. 

 

The peak systolic velocity in the right ICA measures 103 cm/second with an end-diastolic velocity of 
32 cm/second and a systolic ratio of 1.25. 

 

The peak systolic velocity in the left ICA measures 201 cm/second with an end-diastolic velocity of 5
5 cm/second and a systolic ratio of 2.46. 

 

IMPRESSION: 

Moderate (50-69%) stenosis involving the left ICA. 

 

 

 

POS: [facility]

## 2019-07-28 NOTE — NM
NUCLEAR MEDICINE MYOCARDIAL PERFUSION EVALUATION:

 

INDICATIONS:

Chest pain.

 

RADIOPHARMACEUTICAL:

Technetium 99m sestamibi 30.5 millicuries IV with stress.

Technetium 99m sestamibi 10.5 millicuries IV with rest.

 

FINDINGS:

When comparing the rest/stress images, no reversible myocardial perfusion defect is evident.  There i
s normal wall motion and thickening.  Estimated LVEF is 62%.

 

IMPRESSION:

1.  Normal myocardial perfusion evaluation.

 

2.  No scintigraphic evidence to suggest reversible myocardial ischemia.

 

POS: [facility]

## 2019-07-28 NOTE — PDOC.EVN
Event Note





- Event Note


Event Note: 





At approximately 2130, hospital staff were notified that Mrs. Barbosa was 

experiencing left-sided chest pain. The patient stated that the pain began 

around 2000 and felt similar to other anginal episodes that she has had in the 

past, occurred at rest, was sharp in nature and a 5/10 in severity without 

radiation. Mrs. Barbosa's vital signs were within normal limits and she did 

not appear in any acute distress, without evidence of JVD or new-onset murmurs, 

clicks, gallops, or rubs. She admitted to [facility] during her physical exam, but 

denies shortness of breath, diaphoresis, nausea, vomiting, jaw pain, or 

shoulder pain. Review of her telemetry reading showed no acute abnormalities, 

and a repeat EKG was performed that was similar to the EKG performed on 

admission. Additional Troponin and CK-MB values were shown to be within normal 

limits. Mrs. Barbosa was administered a nitro patch by nursing staff, and 

claimed that her symptoms resolved.

## 2019-07-28 NOTE — PDOC.FM
- Subjective


Subjective: 





Brief interaction w/ patient this morning as she was about to go to stress 

test. Denies chest pain, SOB. Her leg hurts. She feels sleepy. No questions or 

other complaints.





- Objective


MAR Reviewed: Yes


Vital Signs & Weight: 


 Vital Signs (12 hours)











  Temp Pulse Resp BP Pulse Ox


 


 07/28/19 04:00  97.7 F  56 L  14  90/54 L  94 L


 


 07/27/19 18:45  99.1 F  56 L  16  124/75  98








 Weight











Weight                         68.356 kg














I&O: 


 











 07/26/19 07/27/19 07/28/19





 06:59 06:59 06:59


 


Intake Total   500


 


Output Total   1650


 


Balance   -1150











Result Diagrams: 


 07/28/19 05:52





 07/28/19 05:52





Phys Exam





- Physical Examination


Constitutional: NAD


Respiratory: no wheezing, clear to auscultation bilateral


Cardiovascular: no significant murmur (bradycardic rate, regular rhythm)


Gastrointestinal: soft, positive bowel sounds





Dx/Plan


(1) Right fibular fracture


Code(s): S82.401A - UNSP FRACTURE OF SHAFT OF RIGHT FIBULA, INIT FOR CLOS FX   

Status: Acute   





(2) Chest pain


Code(s): R07.9 - CHEST PAIN, UNSPECIFIED   Status: Acute   





(3) Left ankle sprain


Code(s): S93.402A - SPRAIN OF UNSPECIFIED LIGAMENT OF LEFT ANKLE, INIT ENCNTR   

Status: Acute   





(4) Syncope and collapse


Code(s): R55 - SYNCOPE AND COLLAPSE   Status: Acute   





(5) Anxiety


Code(s): F41.9 - ANXIETY DISORDER, UNSPECIFIED   Status: Chronic   





(6) Chronic pain


Code(s): G89.29 - OTHER CHRONIC PAIN   Status: Chronic   





(7) Depression


Code(s): F32.9 - MAJOR DEPRESSIVE DISORDER, SINGLE EPISODE, UNSPECIFIED   Status

: Chronic   





(8) Hyperlipidemia


Code(s): E78.5 - HYPERLIPIDEMIA, UNSPECIFIED   Status: Chronic   





(9) Hypothyroidism


Code(s): E03.9 - HYPOTHYROIDISM, UNSPECIFIED   Status: Chronic   


Qualifiers: 


 





(10) Multiple myeloma


Code(s): C90.00 - MULTIPLE MYELOMA NOT HAVING ACHIEVED REMISSION   Status: 

Chronic   


Qualifiers: 


 





(11) History of supraventricular tachycardia


Code(s): Z86.79 - PERSONAL HISTORY OF OTHER DISEASES OF THE CIRCULATORY SYSTEM 

  Status: Chronic   





- Plan


Plan: 





63-yo female w/ multiple complex medical issues presenting for:





1. Syncope & collapse


- Will pursue syncope workup: 


- Bilateral Carotid U/S pending


- ECHO pending


- Orthostatic vital signs pending


- IV Fluids LR





2. R Fibular fracture


- No ambulation 


- Ortho consult this AM





3. L ankle sprain


- Pain management


- Continue patient's fentanyl patch





4. Chest pain, r/o ACS


- In conjunction w/ syncope workup, we will rule out ACS: 


- Nuclear stress test this AM


- Repeat EKG as needed


- ECHO as above


- Nitro prn


- Troponins negative x3


- TSH WNL


- Hgb A1C, Magnesium, Phosphorus WNL


- PPx: LVX. 


- Telemetry observation


- Patient's multiple myeloma not only places her at risk for clotting, but also 

at risk for cardiomyopathy. 





Chronic problems:


Multiple myeloma: continue thalomid


HTN: monitor vitals. Patient seems to run on the more hypotensive side.


Hypothyroidism: continue synthroid


Anxiety/Depression: continue meds








Code status: FULL


PPx: LVX





Becky Munoz MD


PGY-1





Addendum - Attending





- Attending Attestation


Date/Time: 07/28/19 5287





I personally evaluated the patient and discussed the management with Dr. Munoz.


I agree with the History, Examination, Assessment and Plan documented above 

with any addition or exceptions noted below.


Pt will be having a 2-part stress per report.  She is free of chest pain at 

this time.  Ortho will be consulted for the fracture.  Moderate stenosis noted 

carotid doppler.

## 2019-07-28 NOTE — CON
DATE OF CONSULTATION:  



This is Cornel Clifford PA-C dictating a report for Maxwell Carlson MD.



HISTORY OF PRESENT ILLNESS:  We were asked by [facility] Practice Service to see the

patient.  The patient came in on the 27th.  Apparently, she had passed out.  
When

she woke up, her ankles were behind her and she had significant right lower

extremity greater than left lower extremity in the ankle, lower leg pain.  She 
was

unable to walk very well due to pain.  She is able to move both legs well and

sensations are intact.  She does live at home with her disabled daughter.  Other

than the brief loss of consciousness, she awoke, knew what was going on, did not

recall hitting her head whatsoever and that she has been here, felt around her 
head,

has no lumps or bumps.  Today speaking with her, she is in a sugar-tong splint 
on

the right and an Ace wrap on the left ankle.  She is able to move her toes 
well.  No

numbness or tingling, but she is quite sore.  She is going for a stress test 
today. 



PAST MEDICAL HISTORY:  Positive for multiple myeloma, currently in remission.  
She

has had some back fractures, scoliosis, degenerative disks, some neuropathy,

anxiety, depression, PTSD, cardiac issues. 



PAST SURGICAL HISTORY:  Gastric bypass, hysterectomy, tonsils, knee arthroscopy,

cholecystectomy, and spinal surgery. 



FAMILY HISTORY:  For this current visit is noncontributory.



SOCIAL HISTORY:  Resides with daughter.  No alcohol, nicotine, or drug use.



ALLERGIES:  TETRACYCLINE.



CURRENT MEDICATIONS:  

1. Albuterol.

2. DuoNeb.

3. Levothyroxine.

4. Fentanyl patch.

5. Valacyclovir.

6. Bentyl.

7. Zofran.

8. Florastor.

9. Thalomid.

10. Trazodone.



REVIEW OF SYSTEMS:  Currently, bilateral ankle pain, but no shortness of breath 
or

current chest pain.  Bowel and bladder function per the patient are good and 
other

than pain, rest of review of systems is negative. 



PHYSICAL EXAMINATION:

GENERAL:  Well-nourished, well-developed female, resting in bed, no acute 
distress.

Speech clear.  Affect pleasant.  Answers questions appropriately.  She is alert 
and

oriented x3. 

HEENT:  Normal exam.  Face symmetric.  Tongue midline.  No signs of injury to 
the

scalp or face. 

NECK:  Supple.  Trachea midline. 

RESPIRATORY:  Respiratory rate is 16, and in no distress. 

EXTREMITIES:  Upper extremities, equal size, shape, and symmetry.  Normal bulk 
and

tone.  Lower extremities; right lower extremity is in a sugar-tong splint.  Left

lower extremity is in an Ace wrap.  Bilateral DP pulses are equal.  She is able 
to

wiggle her toes and has good sensation.  Otherwise, the legs are equal size, 
shape,

and symmetry other than stated above. 



ASSESSMENT:  

1. Fainted versus syncopal episode, currently being worked up.

2. Right fibular fracture with Left ankle sprain/strain.



PLAN:  We will get her in a boot on the right, see what therapy can do with 
her.  If

she does have some weakness on the left as she is walking, maybe needs an ankle

brace.  Also instructed the patient maybe when she sees her primary doctor, to 
get a

DEXA scan.  Since she has had a gastric bypass and now has a standing fall 
fracture,

she may need to have her bones evaluated, which she understands.  Currently, 
there

is no 

surgical intervention.  I discussed the case with Dr. Carlson.  We reviewed x-
rays

together.  I think this is a good plan for the patient as does she currently.  
We

will follow 

her up while she is in the hospital.  Hopefully, therapy can get her moving back

home with her daughter since she is medically stable and physically stable. 







Job ID:  [id number]



MTDD

## 2019-07-29 VITALS — TEMPERATURE: 97.1 F | DIASTOLIC BLOOD PRESSURE: 61 MMHG | SYSTOLIC BLOOD PRESSURE: 111 MMHG

## 2019-07-29 RX ADMIN — NITROGLYCERIN SCH: 20 OINTMENT TOPICAL at 05:24

## 2019-07-29 RX ADMIN — NITROGLYCERIN SCH INCH: 20 OINTMENT TOPICAL at 08:12

## 2019-07-29 RX ADMIN — ALUMINUM ZIRCONIUM TRICHLOROHYDREX GLY SCH EACH: 0.2 STICK TOPICAL at 08:27

## 2019-07-29 RX ADMIN — ALUMINUM ZIRCONIUM TRICHLOROHYDREX GLY SCH: 0.2 STICK TOPICAL at 01:36

## 2019-07-29 RX ADMIN — ALUMINUM ZIRCONIUM TRICHLOROHYDREX GLY SCH: 0.2 STICK TOPICAL at 01:35

## 2019-07-29 RX ADMIN — NITROGLYCERIN SCH INCH: 20 OINTMENT TOPICAL at 15:16

## 2019-07-29 NOTE — DIS
DATE OF ADMISSION:  07/27/2019



DATE OF DISCHARGE:  07/29/2019



RESIDENT:  César Rudd MD.



ADMITTING ATTENDING:  Stephanie Padron MD.



DISCHARGE ATTENDING:  Rui Devine MD.



CONSULTS:  Orthopedic, Dr. Maxwell Carlson.



PROCEDURES:  

1. Cervical spine CT-no acute fracture or subluxation demonstrated.  Moderate to

severe spondylosis of cervical spine. 

2. Right ankle x-ray-oblique minimally displaced fracture involving the distal

fibula with associated soft tissue swelling. 

3. Left ankle x-ray-no fracture or dislocation seen.  Soft tissue swelling 
present. 

4. Brain CT without contrast-no acute intracranial abnormality.

5. Right foot x-ray-no acute fracture or subluxation demonstrated.  Spiral 
fracture

of the lateral malleolus. 

6. Chest x-ray-no acute cardiopulmonary process.

7. Carotid Doppler study-moderate 50% to 69% stenosis involving the left ICA.

8. Stress test nuclear medicine-normal cardiac myocardial perfusion.  No 
evidence to

suggest reversible myocardial ischemia. 

9. Echocardiogram-ejection fraction 55% to 60%.  No cardiomegaly noted. 

10. Steroid joint injection of left knee.



PRIMARY DIAGNOSES:  

1. Right spiral fibular fracture.

2. Syncope.

3. Left ankle sprain.

4. Noncardiac chest pain, likely musculoskeletal in nature.



SECONDARY DIAGNOSES:  

1. Multiple myeloma.

2. Hypertension.

3. Hypothyroidism.

4. Anxiety and depression.



DISCHARGE MEDICATIONS:  

1. Atorvastatin 40 mg p.o. at bedtime.

2. Fluoxetine 10 mg p.o. daily.

3. Tramadol 50 mg p.o. q.4 hours p.r.n.

4. Valacyclovir 500 mg p.o. daily.

5. Levothyroxine 50 mcg p.o. daily.

6. DuoNeb 3 mL nebulizer q.i.d. p.r.n.

7. Albuterol sulfate inhaler 1 to 2 puffs q.6 hours p.r.n.

8. Fentanyl 25 mcg patch, apply one patch q.3 days.

9. Bentyl 20 mg p.o. q.i.d.

10. Zofran 4 mg p.o. q.6 hours p.r.n.

11. Florastor 250 mg p.o. daily.

12. Trazodone 100 mg p.o. p.r.n.

13. Thalomid 50 mg p.o. daily.



DISCONTINUED MEDICATIONS:  None.



HISTORY OF PRESENT ILLNESS AND HOSPITAL COURSE:  Ms. Ignacio is a 63-year-old

 female with significant past medical history including multiple 
myeloma,

gastric bypass, SVT, hyperlipidemia, hypothyroidism, who presented to the ER 
after

having a fall.  She stated the fall occurred when she fainted near the stairs 
and

she sustained a right distal fibular fracture and a left ankle sprain.  She 
reports

that her daughter, who is intellectually disabled, could not wake her up for a

couple of minutes.  Prior to the fall, the patient states she became weak and 
tried

to brace herself.  She had tunnel vision and felt very faint prior to passing 
out.

The patient reports that she has multiple falls per week over the past several

months, has attributed this to her dehydration and low blood pressure and 
stopped

taking her bisoprolol for SVT.  She denies any other symptoms associated with 
her

syncopal episodes such as focal neural deficits or seizure-like activity. 



In the ED, the patient began to experience chest pain.  She described as a 
pressure

which radiates to her right ribs and into her neck.  She denies any nausea,

vomiting, or diaphoresis with these episodes.  She states she has had this pain

previously and noted this has been off and on for past few months and is 
associated

with exertion.  She says that she takes an aspirin and lays down for about 30

minutes and the pain resolves.  The pain is associated with mild shortness of

breath.  In the ED, the right distal fibular fracture was splinted.  She was 
given

nitroglycerin paste for her chest pain with improvement of pain.  She was 
admitted

for further evaluation and management. 



Once on the floor for her syncope and collapse, bilateral carotid ultrasound was

ordered with results as above.  An echocardiogram was ordered with results as 
above.

 Orthostatic vital signs were ordered which were negative.  She was started on

maintenance IV fluids.  Her blood pressure remained in the 90s and orthostatic 
and

hypotensive Fall precautions were discussed with the patient.  In conjunction 
with

her chest pain, a nuclear stress test was ordered that did not show any acute

blockages or perfusion deficits.  Troponins were trended x4, which were all

negative.  Electrolytes including magnesium, phosphorus, as well as TSH were all

within normal limits.  The patient was placed on telemetry overnight and did not

have any acute event.  She did have return of chest pain a few times while in

inpatient.  The pain resolved with morphine and nitroglycerin paste.  During 
these

episodes, she did not have any EKG changes or arrhythmias, and troponins were

trended which continued to be negative.  Her chronic medical problems including

multiple myeloma, hypertension, hypothyroidism, anxiety, depression were all

continued with her home medications. 



On the morning of discharge, the patient again had one of these chest pain 
episodes

lasting approximately 20 minutes and describes a squeezing tightness in her 
chest

that was resolved with morphine.  Again, no telemetry or EKG changes were noted.

Determined that her pain was likely musculoskeletal in nature.  Orthopedics was

consulted, who evaluated her fibular fracture and determined that a boot would 
be

sufficient treatment.  Physical therapy was consulted who came by and evaluated 
her

and she was able to ambulate around the room with minimal assistance and they 
recommended

home health and home physical therapy for further management.  The patient did 
not

have any further syncope events.  It was stated that her syncope could be due 
to her

multiple myeloma medications as well as hypotension.  Thus, recommendations to 
stay

hydrated as well as fall precautions, orthostatic prevention such as rising 
slowly

from rest were discussed with the patient and the patient was in agreement with 
the

plan.  It was decided the patient to be discharged home with home health, to 
follow

up with her primary care physician and Orthopedics as an outpatient.  The 
patient

voiced agreement, understanding of the discharge plan and was eager to go home.

Orthopedics did recommend the patient would benefit from a DEXA scan due to her 
ease

of fracture with minimal trauma.  The patient also voiced that she would have a

daughter coming in from out of town to help her around the home over the next 
few

weeks. 



DISPOSITION:  Stable.



DISCHARGE INSTRUCTIONS:  

1. Location:  Home with home health.

2. Diet:  Regular as tolerated.

3. Activity:  Limited due to right ankle fibula fracture as well as orthostatic

precautions.  The patient should follow with Physical Therapy recommendations

regarding activity and exercises. 

4. Followup:  The patient should follow up with primary care physician within 
one

week, it was recommended by Orthopedics that she could benefit from an 
outpatient

DEXA scan.  The patient should also follow up with Orthopedics within 10 days. 







Job ID:  [id number]



MTDD

## 2019-07-29 NOTE — PRG
DATE OF SERVICE:  07/29/2019



Ms. Barbosa looks and feels fine this morning.  Her stress Myoview was negative for

reversible ischemia.  Her echocardiogram was normal.  She is on normal sinus rhythm

during her stay.  Interestingly, she has recently lost 125 pounds after gastric

bypass surgery and I wonder she is assuming a new norm for her blood pressure.  In

the event, she is tending to run a lower blood pressure and may take her a while to

acclimate to this.  We have ruled out any serious cause of syncope as it does seem

to be related more to her low blood pressure.  She had already stopped her

bisoprolol that she was taking for SVT because of this. 







Job ID:  [id number]

## 2019-07-29 NOTE — PDOC.FM
- Subjective


Subjective: 


Pt had episode of chest pain this morning lasting about 20 minutes and 

described as a squeezing, dull left-sided pain similar to prior episodes. No 

associated SOB, n/v, or diaphoresis or radiation of pain. Pt was given nitro 

and morphine with resolution of sxs. Tele did not show any acute changes during

, before, or after episode. Otherwise patient is doing well this morning and 

eager for PT to attempt ambulation. She states she has multiple falls a week 

described as "vision closing in" and fainting. She states these occur most when 

she is going from sitting to standing. No fevers/chills.





Pt lives with her adult, mentally handicapped daughter and is primary 

caregiver. She states she has another daughter who is coming in town to help 

out for the next few weeks but states she would like to see if home health 

could be arranged for at home PT.








- Objective


MAR Reviewed: Yes


Vital Signs & Weight: 


 Vital Signs (12 hours)











  Temp Pulse Resp BP Pulse Ox


 


 07/29/19 03:32  97.3 F L  53 L  16  98/54 L  93 L


 


 07/28/19 19:50  97.5 F L  60  18  113/59 L  97








 Weight











Weight                         68.356 kg














I&O: 


 











 07/27/19 07/28/19 07/29/19





 06:59 06:59 06:59


 


Intake Total  500 840


 


Output Total  1650 1500


 


Balance  -1150 -660











Result Diagrams: 


 07/28/19 05:52





 07/28/19 05:52


EKG Reviewed by me: Yes (Tele: first degree AV block with BBB, unchanged from 

admit.)


Radiology Reviewed by me: Yes





Phys Exam





- Physical Examination


Constitutional: NAD


HEENT: moist MMs


Respiratory: no wheezing, no rales, no rhonchi, clear to auscultation bilateral


Cardiovascular: no significant murmur, no rub


Bradycardia, regular rhythm


Gastrointestinal: soft, non-tender, no distention, positive bowel sounds


Musculoskeletal: no edema, pulses present


Boot on right leg, left ankle wrapped with mild edema and TTP.


Neurological: normal sensation, moves all 4 limbs


Psychiatric: normal affect, A&O x 3





Dx/Plan


(1) Syncope and collapse


Code(s): R55 - SYNCOPE AND COLLAPSE   Status: Acute   





(2) Right fibular fracture


Code(s): S82.401A - UNSP FRACTURE OF SHAFT OF RIGHT FIBULA, INIT FOR CLOS FX   

Status: Acute   


Qualifiers: 


   Fracture morphology: spiral 





(3) Chest pain


Code(s): R07.9 - CHEST PAIN, UNSPECIFIED   Status: Acute   





(4) Left ankle sprain


Code(s): S93.402A - SPRAIN OF UNSPECIFIED LIGAMENT OF LEFT ANKLE, INIT ENCNTR   

Status: Acute   





(5) History of supraventricular tachycardia


Code(s): Z86.79 - PERSONAL HISTORY OF OTHER DISEASES OF THE CIRCULATORY SYSTEM 

  Status: Chronic   





(6) Chronic pain


Code(s): G89.29 - OTHER CHRONIC PAIN   Status: Chronic   





(7) Multiple myeloma


Code(s): C90.00 - MULTIPLE MYELOMA NOT HAVING ACHIEVED REMISSION   Status: 

Chronic   


Qualifiers: 


 





- Plan


Plan: 


63-year-old female w/ significant PMHx including multiple myeloma, gastric 

bypass, SVT, HLD, and hypothyroidism presented w/ syncope and found to have 

right fibular fracture





1. Syncope & collapse


- CT head WNL, Echo WNL, Carotid Doppler demonstrated moderate stenosis of L ICA


- Orthostatic vital signs negative, however hypotensive and bradycardic at 

baseline. Possible thalmoid SE. Counseled on fall precautions and slow to rise. 


- IV Fluids LR @ 125





2. R Fibular fracture


- Spiral fracture


- Will check Vit D


- Ortho consulted, appreciate recs, placed in boot and PT eval and stated able 

to ambulate at home, rec DEXA as OP with PCP





3. L ankle sprain


- Pain management


- Continue patient's home fentanyl patch


- PT to eval and tx, apprec recs





4. Chest pain, r/o ACS


- In conjunction w/ syncope workup


- Echo WNL, Stress with no significant stenosis, trops negative x4, TSH WNL.


- Hgb A1C, Magnesium, Phosphorus WNL 


- Telemetry observation


- Patient's multiple myeloma not only places her at risk for clotting, but also 

at risk for cardiomyopathy. 





5. Multiple myeloma


- continue home thalomid





6. HTN


- monitor vitals. Patient seems to run on the more hypotensive side. 

Orthostatics negative. Given fall precautions for discharge.





7. Hypothyroidism


- TSH WNL, continue home synthroid





8. Anxiety/Depression


- continue home meds





PPx: LVX


Diet: Heart Healthy


Code status: FULL





Dispo: Passed PT eval this AM. CM to state home health discussion with 

discharge this PM.

## 2019-07-30 NOTE — CON
DATE OF CONSULTATION:  07/29/2019



CHIEF COMPLAINT:  Right ankle pain, left knee pain, left ankle pain.



HISTORY OF PRESENT ILLNESS:  Ms. Barbosa is a 63-year-old female, status post

injury to her right ankle sustaining a distal fibula fracture.  She was 
admitted by

the Medicine Service for syncopal episode and further evaluation.  The patient 
is

resting comfortably in bed. I saw her this morning for evaluation.  I discussed 
with

her the knee pain as well as her splint.  I removed the hard splint and placed 
the

boot back on the patient's foot, which improved her pain.  The patient is 
currently

resting comfortably in bed.  She received an injection from roselyn Das 
PA.

She will be discharged and followed up in 2 to 3 weeks.  The patient will 
remain in

the boot for 6 weeks, weightbearing as tolerated.  Follow up in 2 weeks, we will

have her follow up with pre clinic x-rays.  The patient's outlook is

guarded. 







Job ID:  [id number]



MTDD

## 2020-04-06 ENCOUNTER — HOSPITAL ENCOUNTER (EMERGENCY)
Dept: HOSPITAL 9 - MADERS | Age: 65
Discharge: HOME | End: 2020-04-06
Payer: MEDICARE

## 2020-04-06 DIAGNOSIS — E03.9: ICD-10-CM

## 2020-04-06 DIAGNOSIS — R07.9: ICD-10-CM

## 2020-04-06 DIAGNOSIS — K21.9: ICD-10-CM

## 2020-04-06 DIAGNOSIS — F32.9: ICD-10-CM

## 2020-04-06 DIAGNOSIS — R30.0: Primary | ICD-10-CM

## 2020-04-06 DIAGNOSIS — I10: ICD-10-CM

## 2020-04-06 DIAGNOSIS — E78.5: ICD-10-CM

## 2020-04-06 DIAGNOSIS — J45.909: ICD-10-CM

## 2020-04-06 LAB
ALBUMIN SERPL BCG-MCNC: 3.9 G/DL (ref 3.4–4.8)
ALP SERPL-CCNC: 76 U/L (ref 40–110)
ALT SERPL W P-5'-P-CCNC: 37 U/L (ref 8–55)
ANION GAP SERPL CALC-SCNC: 14 MMOL/L (ref 10–20)
AST SERPL-CCNC: 23 U/L (ref 5–34)
BASOPHILS # BLD AUTO: 0.1 THOU/UL (ref 0–0.2)
BASOPHILS NFR BLD AUTO: 1.9 % (ref 0–1)
BILIRUB SERPL-MCNC: 0.2 MG/DL (ref 0.2–1.2)
BUN SERPL-MCNC: 22 MG/DL (ref 9.8–20.1)
CALCIUM SERPL-MCNC: 9 MG/DL (ref 7.8–10.44)
CHLORIDE SERPL-SCNC: 103 MMOL/L (ref 98–107)
CK SERPL-CCNC: 77 U/L (ref 29–168)
CO2 SERPL-SCNC: 26 MMOL/L (ref 23–31)
CREAT CL PREDICTED SERPL C-G-VRATE: 0 ML/MIN (ref 70–130)
EOSINOPHIL # BLD AUTO: 0.2 THOU/UL (ref 0–0.7)
EOSINOPHIL NFR BLD AUTO: 3.4 % (ref 0–10)
GLOBULIN SER CALC-MCNC: 2.2 G/DL (ref 2.4–3.5)
GLUCOSE SERPL-MCNC: 101 MG/DL (ref 80–115)
HGB BLD-MCNC: 12 G/DL (ref 12–16)
LYMPHOCYTES # BLD AUTO: 1.8 THOU/UL (ref 1.2–3.4)
LYMPHOCYTES NFR BLD AUTO: 35.5 % (ref 21–51)
MCH RBC QN AUTO: 29.9 PG (ref 27–31)
MCV RBC AUTO: 94 FL (ref 78–98)
MONOCYTES # BLD AUTO: 0.4 THOU/UL (ref 0.11–0.59)
MONOCYTES NFR BLD AUTO: 8.5 % (ref 0–10)
NEUTROPHILS # BLD AUTO: 2.5 THOU/UL (ref 1.4–6.5)
NEUTROPHILS NFR BLD AUTO: 50.7 % (ref 42–75)
PLATELET # BLD AUTO: 153 THOU/UL (ref 130–400)
POTASSIUM SERPL-SCNC: 4.6 MMOL/L (ref 3.5–5.1)
RBC # BLD AUTO: 4.02 MILL/UL (ref 4.2–5.4)
SODIUM SERPL-SCNC: 138 MMOL/L (ref 136–145)
WBC # BLD AUTO: 5 THOU/UL (ref 4.8–10.8)

## 2020-04-06 PROCEDURE — 85025 COMPLETE CBC W/AUTO DIFF WBC: CPT

## 2020-04-06 PROCEDURE — 82550 ASSAY OF CK (CPK): CPT

## 2020-04-06 PROCEDURE — 36415 COLL VENOUS BLD VENIPUNCTURE: CPT

## 2020-04-06 PROCEDURE — 84484 ASSAY OF TROPONIN QUANT: CPT

## 2020-04-06 PROCEDURE — 81003 URINALYSIS AUTO W/O SCOPE: CPT

## 2020-04-06 PROCEDURE — 93005 ELECTROCARDIOGRAM TRACING: CPT

## 2020-04-06 PROCEDURE — 80053 COMPREHEN METABOLIC PANEL: CPT

## 2020-04-06 PROCEDURE — 71046 X-RAY EXAM CHEST 2 VIEWS: CPT

## 2020-04-06 NOTE — RAD
PA AND LATERAL OF THE CHEST:

 

INDICATION: 

Chest pain.

 

COMPARISON: 

Prior exam dated 7/27/2019.

 

FINDINGS: 

The nipple shadow overlies the right chest wall.  No consolidation is evident.  Heart size is normal.
  There is multilevel spondylosis of the thoracic spine.

 

IMPRESSION: 

No acute cardiopulmonary abnormality.

 

POS: [facility]

## 2020-07-07 ENCOUNTER — HOSPITAL ENCOUNTER (OUTPATIENT)
Dept: HOSPITAL 9 - MADLAB | Age: 65
Discharge: HOME | End: 2020-07-07
Attending: PHYSICIAN ASSISTANT
Payer: MEDICARE

## 2020-07-07 DIAGNOSIS — C90.00: Primary | ICD-10-CM

## 2020-07-07 LAB
BASOPHILS # BLD AUTO: 0.1 THOU/UL (ref 0–0.2)
BASOPHILS NFR BLD AUTO: 2.1 % (ref 0–1)
EOSINOPHIL # BLD AUTO: 0.2 THOU/UL (ref 0–0.7)
EOSINOPHIL NFR BLD AUTO: 4 % (ref 0–10)
HGB BLD-MCNC: 11.9 G/DL (ref 12–16)
LYMPHOCYTES # BLD AUTO: 1.5 THOU/UL (ref 1.2–3.4)
LYMPHOCYTES NFR BLD AUTO: 28.5 % (ref 21–51)
MCH RBC QN AUTO: 29.4 PG (ref 27–31)
MCV RBC AUTO: 95.4 FL (ref 78–98)
MONOCYTES # BLD AUTO: 0.5 THOU/UL (ref 0.11–0.59)
MONOCYTES NFR BLD AUTO: 9.4 % (ref 0–10)
NEUTROPHILS # BLD AUTO: 2.9 THOU/UL (ref 1.4–6.5)
NEUTROPHILS NFR BLD AUTO: 55.9 % (ref 42–75)
PLATELET # BLD AUTO: 196 THOU/UL (ref 130–400)
RBC # BLD AUTO: 4.04 MILL/UL (ref 4.2–5.4)
WBC # BLD AUTO: 5.2 THOU/UL (ref 4.8–10.8)

## 2020-07-07 PROCEDURE — 85025 COMPLETE CBC W/AUTO DIFF WBC: CPT

## 2020-07-07 PROCEDURE — 36415 COLL VENOUS BLD VENIPUNCTURE: CPT

## 2020-10-02 ENCOUNTER — HOSPITAL ENCOUNTER (OUTPATIENT)
Dept: HOSPITAL 9 - MADLAB | Age: 65
Discharge: HOME | End: 2020-10-02
Attending: PHYSICIAN ASSISTANT
Payer: MEDICARE

## 2020-10-02 DIAGNOSIS — C90.00: Primary | ICD-10-CM

## 2020-10-02 LAB
BASOPHILS # BLD AUTO: 0.1 THOU/UL (ref 0–0.2)
BASOPHILS NFR BLD AUTO: 2.3 % (ref 0–1)
EOSINOPHIL # BLD AUTO: 0.2 THOU/UL (ref 0–0.7)
EOSINOPHIL NFR BLD AUTO: 3.7 % (ref 0–10)
HGB BLD-MCNC: 13.9 G/DL (ref 12–16)
LYMPHOCYTES # BLD AUTO: 1.9 THOU/UL (ref 1.2–3.4)
LYMPHOCYTES NFR BLD AUTO: 34.3 % (ref 21–51)
MCH RBC QN AUTO: 29.1 PG (ref 27–31)
MCV RBC AUTO: 92 FL (ref 78–98)
MONOCYTES # BLD AUTO: 0.5 THOU/UL (ref 0.11–0.59)
MONOCYTES NFR BLD AUTO: 9.4 % (ref 0–10)
NEUTROPHILS # BLD AUTO: 2.8 THOU/UL (ref 1.4–6.5)
NEUTROPHILS NFR BLD AUTO: 50.2 % (ref 42–75)
PLATELET # BLD AUTO: 242 THOU/UL (ref 130–400)
RBC # BLD AUTO: 4.76 MILL/UL (ref 4.2–5.4)
WBC # BLD AUTO: 5.6 THOU/UL (ref 4.8–10.8)

## 2020-10-02 PROCEDURE — 85025 COMPLETE CBC W/AUTO DIFF WBC: CPT

## 2020-10-02 PROCEDURE — 36415 COLL VENOUS BLD VENIPUNCTURE: CPT

## 2020-11-10 ENCOUNTER — HOSPITAL ENCOUNTER (OUTPATIENT)
Dept: HOSPITAL 9 - MADLABBHPM | Age: 65
Discharge: HOME | End: 2020-11-10
Attending: PHYSICIAN ASSISTANT
Payer: MEDICARE

## 2020-11-10 DIAGNOSIS — C90.00: Primary | ICD-10-CM

## 2020-11-10 LAB
BASOPHILS # BLD AUTO: 0.1 THOU/UL (ref 0–0.2)
BASOPHILS NFR BLD AUTO: 1.9 % (ref 0–1)
EOSINOPHIL # BLD AUTO: 0.1 THOU/UL (ref 0–0.7)
EOSINOPHIL NFR BLD AUTO: 3.4 % (ref 0–10)
HGB BLD-MCNC: 12.8 G/DL (ref 12–16)
LYMPHOCYTES # BLD AUTO: 1.4 THOU/UL (ref 1.2–3.4)
LYMPHOCYTES NFR BLD AUTO: 32.9 % (ref 21–51)
MCH RBC QN AUTO: 30.7 PG (ref 27–31)
MCV RBC AUTO: 93.6 FL (ref 78–98)
MONOCYTES # BLD AUTO: 0.4 THOU/UL (ref 0.11–0.59)
MONOCYTES NFR BLD AUTO: 9.4 % (ref 0–10)
NEUTROPHILS # BLD AUTO: 2.2 THOU/UL (ref 1.4–6.5)
NEUTROPHILS NFR BLD AUTO: 52.5 % (ref 42–75)
PLATELET # BLD AUTO: 195 THOU/UL (ref 130–400)
RBC # BLD AUTO: 4.17 MILL/UL (ref 4.2–5.4)
WBC # BLD AUTO: 4.1 THOU/UL (ref 4.8–10.8)

## 2020-11-10 PROCEDURE — 36415 COLL VENOUS BLD VENIPUNCTURE: CPT

## 2020-11-10 PROCEDURE — 85025 COMPLETE CBC W/AUTO DIFF WBC: CPT

## 2020-12-07 ENCOUNTER — HOSPITAL ENCOUNTER (OUTPATIENT)
Dept: HOSPITAL 9 - MADLAB | Age: 65
Discharge: HOME | End: 2020-12-07
Attending: PHYSICIAN ASSISTANT
Payer: MEDICARE

## 2020-12-07 DIAGNOSIS — C90.00: Primary | ICD-10-CM

## 2020-12-07 LAB
BASOPHILS # BLD AUTO: 0.1 THOU/UL (ref 0–0.2)
BASOPHILS NFR BLD AUTO: 2.7 % (ref 0–1)
EOSINOPHIL # BLD AUTO: 0.2 THOU/UL (ref 0–0.7)
EOSINOPHIL NFR BLD AUTO: 4.6 % (ref 0–10)
HGB BLD-MCNC: 13.2 G/DL (ref 12–16)
LYMPHOCYTES # BLD AUTO: 1.6 THOU/UL (ref 1.2–3.4)
LYMPHOCYTES NFR BLD AUTO: 33.6 % (ref 21–51)
MCH RBC QN AUTO: 29.8 PG (ref 27–31)
MCV RBC AUTO: 94.1 FL (ref 78–98)
MONOCYTES # BLD AUTO: 0.5 THOU/UL (ref 0.11–0.59)
MONOCYTES NFR BLD AUTO: 10.6 % (ref 0–10)
NEUTROPHILS # BLD AUTO: 2.3 THOU/UL (ref 1.4–6.5)
NEUTROPHILS NFR BLD AUTO: 48.6 % (ref 42–75)
PLATELET # BLD AUTO: 190 THOU/UL (ref 130–400)
RBC # BLD AUTO: 4.43 MILL/UL (ref 4.2–5.4)
WBC # BLD AUTO: 4.7 THOU/UL (ref 4.8–10.8)

## 2020-12-07 PROCEDURE — 36415 COLL VENOUS BLD VENIPUNCTURE: CPT

## 2020-12-07 PROCEDURE — 85025 COMPLETE CBC W/AUTO DIFF WBC: CPT

## 2021-01-13 ENCOUNTER — HOSPITAL ENCOUNTER (OUTPATIENT)
Dept: HOSPITAL 9 - MADLAB | Age: 66
Discharge: HOME | End: 2021-01-13
Attending: PHYSICIAN ASSISTANT
Payer: MEDICARE

## 2021-01-13 DIAGNOSIS — C90.00: Primary | ICD-10-CM

## 2021-01-13 LAB
BASOPHILS # BLD AUTO: 0.1 THOU/UL (ref 0–0.2)
BASOPHILS NFR BLD AUTO: 2.2 % (ref 0–1)
EOSINOPHIL # BLD AUTO: 0.1 THOU/UL (ref 0–0.7)
EOSINOPHIL NFR BLD AUTO: 2.2 % (ref 0–10)
HGB BLD-MCNC: 13.6 G/DL (ref 12–16)
LYMPHOCYTES # BLD AUTO: 1.4 THOU/UL (ref 1.2–3.4)
LYMPHOCYTES NFR BLD AUTO: 23.5 % (ref 21–51)
MCH RBC QN AUTO: 30.4 PG (ref 27–31)
MCV RBC AUTO: 92.5 FL (ref 78–98)
MONOCYTES # BLD AUTO: 0.5 THOU/UL (ref 0.11–0.59)
MONOCYTES NFR BLD AUTO: 8.2 % (ref 0–10)
NEUTROPHILS # BLD AUTO: 3.9 THOU/UL (ref 1.4–6.5)
NEUTROPHILS NFR BLD AUTO: 63.9 % (ref 42–75)
PLATELET # BLD AUTO: 194 THOU/UL (ref 130–400)
RBC # BLD AUTO: 4.46 MILL/UL (ref 4.2–5.4)
WBC # BLD AUTO: 6.1 THOU/UL (ref 4.8–10.8)

## 2021-01-13 PROCEDURE — 36415 COLL VENOUS BLD VENIPUNCTURE: CPT

## 2021-01-13 PROCEDURE — 85025 COMPLETE CBC W/AUTO DIFF WBC: CPT

## 2021-08-27 ENCOUNTER — HOSPITAL ENCOUNTER (OUTPATIENT)
Dept: HOSPITAL 92 - PET | Age: 66
Discharge: HOME | End: 2021-08-27
Attending: INTERNAL MEDICINE
Payer: MEDICARE

## 2021-08-27 DIAGNOSIS — C90.00: Primary | ICD-10-CM

## 2021-08-27 PROCEDURE — A9552 F18 FDG: HCPCS

## 2021-08-27 PROCEDURE — 78815 PET IMAGE W/CT SKULL-THIGH: CPT

## 2021-10-22 ENCOUNTER — HOSPITAL ENCOUNTER (EMERGENCY)
Dept: HOSPITAL 9 - MADERS | Age: 66
LOS: 1 days | Discharge: ANOTHER HOSPITAL | End: 2021-10-23
Payer: MEDICARE

## 2021-10-22 DIAGNOSIS — J45.909: ICD-10-CM

## 2021-10-22 DIAGNOSIS — I10: ICD-10-CM

## 2021-10-22 DIAGNOSIS — E78.5: ICD-10-CM

## 2021-10-22 DIAGNOSIS — K21.9: ICD-10-CM

## 2021-10-22 DIAGNOSIS — E66.9: ICD-10-CM

## 2021-10-22 DIAGNOSIS — E78.00: ICD-10-CM

## 2021-10-22 DIAGNOSIS — E03.9: ICD-10-CM

## 2021-10-22 DIAGNOSIS — M79.81: Primary | ICD-10-CM

## 2021-10-22 PROCEDURE — 85025 COMPLETE CBC W/AUTO DIFF WBC: CPT

## 2021-10-22 PROCEDURE — 80053 COMPREHEN METABOLIC PANEL: CPT

## 2021-10-23 ENCOUNTER — HOSPITAL ENCOUNTER (EMERGENCY)
Dept: HOSPITAL 92 - CSHERS | Age: 66
Discharge: HOME | End: 2021-10-23
Payer: MEDICARE

## 2021-10-23 DIAGNOSIS — C90.00: ICD-10-CM

## 2021-10-23 DIAGNOSIS — E78.5: ICD-10-CM

## 2021-10-23 DIAGNOSIS — K21.9: ICD-10-CM

## 2021-10-23 DIAGNOSIS — E66.9: ICD-10-CM

## 2021-10-23 DIAGNOSIS — J45.909: ICD-10-CM

## 2021-10-23 DIAGNOSIS — E78.00: ICD-10-CM

## 2021-10-23 DIAGNOSIS — I10: ICD-10-CM

## 2021-10-23 DIAGNOSIS — M79.601: ICD-10-CM

## 2021-10-23 DIAGNOSIS — E03.9: ICD-10-CM

## 2021-10-23 DIAGNOSIS — S46.211A: Primary | ICD-10-CM

## 2021-10-23 DIAGNOSIS — Z79.899: ICD-10-CM

## 2021-10-23 LAB
ALBUMIN SERPL BCG-MCNC: 4.3 G/DL (ref 3.4–4.8)
ALP SERPL-CCNC: 104 U/L (ref 40–110)
ALT SERPL W P-5'-P-CCNC: 46 U/L (ref 8–55)
ANION GAP SERPL CALC-SCNC: 14 MMOL/L (ref 10–20)
AST SERPL-CCNC: 38 U/L (ref 5–34)
BILIRUB SERPL-MCNC: 0.3 MG/DL (ref 0.2–1.2)
BUN SERPL-MCNC: 21 MG/DL (ref 9.8–20.1)
CALCIUM SERPL-MCNC: 9.7 MG/DL (ref 7.8–10.44)
CHLORIDE SERPL-SCNC: 102 MMOL/L (ref 98–107)
CO2 SERPL-SCNC: 24 MMOL/L (ref 23–31)
CREAT CL PREDICTED SERPL C-G-VRATE: 0 ML/MIN (ref 70–130)
GLOBULIN SER CALC-MCNC: 2.4 G/DL (ref 2.4–3.5)
GLUCOSE SERPL-MCNC: 100 MG/DL (ref 80–115)
HGB BLD-MCNC: 13.1 G/DL (ref 12–16)
MCH RBC QN AUTO: 28.1 PG (ref 27–31)
MCV RBC AUTO: 90.8 FL (ref 78–98)
MDIFF COMPLETE?: YES
PLATELET # BLD AUTO: 194 THOU/UL (ref 130–400)
POTASSIUM SERPL-SCNC: 3.5 MMOL/L (ref 3.5–5.1)
RBC # BLD AUTO: 4.67 MILL/UL (ref 4.2–5.4)
SODIUM SERPL-SCNC: 136 MMOL/L (ref 136–145)
WBC # BLD AUTO: 7.2 THOU/UL (ref 4.8–10.8)

## 2021-10-23 PROCEDURE — 96372 THER/PROPH/DIAG INJ SC/IM: CPT

## 2022-03-04 ENCOUNTER — HOSPITAL ENCOUNTER (OUTPATIENT)
Dept: HOSPITAL 92 - PET | Age: 67
Discharge: HOME | End: 2022-03-04
Attending: INTERNAL MEDICINE
Payer: MEDICARE

## 2022-03-04 DIAGNOSIS — C90.00: Primary | ICD-10-CM

## 2022-03-04 PROCEDURE — A9552 F18 FDG: HCPCS

## 2022-03-04 PROCEDURE — 78815 PET IMAGE W/CT SKULL-THIGH: CPT

## 2023-06-21 ENCOUNTER — HOSPITAL ENCOUNTER (EMERGENCY)
Dept: HOSPITAL 92 - ERS | Age: 68
Discharge: HOME | End: 2023-06-21
Payer: COMMERCIAL
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
## 2023-06-21 DIAGNOSIS — E03.9: ICD-10-CM

## 2023-06-21 DIAGNOSIS — S92.001A: Primary | ICD-10-CM

## 2023-06-21 DIAGNOSIS — Z79.01: ICD-10-CM

## 2023-06-21 DIAGNOSIS — K21.9: ICD-10-CM

## 2023-06-21 DIAGNOSIS — E66.9: ICD-10-CM

## 2023-06-21 PROCEDURE — 90715 TDAP VACCINE 7 YRS/> IM: CPT

## 2023-06-21 PROCEDURE — 90471 IMMUNIZATION ADMIN: CPT
